# Patient Record
Sex: MALE
[De-identification: names, ages, dates, MRNs, and addresses within clinical notes are randomized per-mention and may not be internally consistent; named-entity substitution may affect disease eponyms.]

---

## 2017-07-25 ENCOUNTER — HOSPITAL ENCOUNTER (OUTPATIENT)
Dept: HOSPITAL 42 - ED | Age: 77
Setting detail: OBSERVATION
LOS: 1 days | Discharge: LEFT BEFORE DISCHARGE | End: 2017-07-26
Attending: INTERNAL MEDICINE | Admitting: INTERNAL MEDICINE
Payer: MEDICARE

## 2017-07-25 ENCOUNTER — HOSPITAL ENCOUNTER (OUTPATIENT)
Dept: HOSPITAL 42 - CATH | Age: 77
Discharge: HOME | End: 2017-07-25
Attending: INTERNAL MEDICINE
Payer: MEDICARE

## 2017-07-25 VITALS — HEART RATE: 61 BPM | RESPIRATION RATE: 18 BRPM | SYSTOLIC BLOOD PRESSURE: 164 MMHG | DIASTOLIC BLOOD PRESSURE: 74 MMHG

## 2017-07-25 VITALS — BODY MASS INDEX: 28.6 KG/M2

## 2017-07-25 VITALS — BODY MASS INDEX: 23.5 KG/M2

## 2017-07-25 VITALS — TEMPERATURE: 97.7 F

## 2017-07-25 VITALS — OXYGEN SATURATION: 99 %

## 2017-07-25 DIAGNOSIS — E78.00: ICD-10-CM

## 2017-07-25 DIAGNOSIS — Z87.891: ICD-10-CM

## 2017-07-25 DIAGNOSIS — Z91.14: ICD-10-CM

## 2017-07-25 DIAGNOSIS — Z95.5: ICD-10-CM

## 2017-07-25 DIAGNOSIS — R73.03: ICD-10-CM

## 2017-07-25 DIAGNOSIS — I25.119: Primary | ICD-10-CM

## 2017-07-25 DIAGNOSIS — I10: ICD-10-CM

## 2017-07-25 DIAGNOSIS — Z79.82: ICD-10-CM

## 2017-07-25 DIAGNOSIS — I25.10: Primary | ICD-10-CM

## 2017-07-25 DIAGNOSIS — N28.9: ICD-10-CM

## 2017-07-25 LAB
ALBUMIN SERPL-MCNC: 3.6 G/DL (ref 3–4.8)
ALBUMIN/GLOB SERPL: 1.1 {RATIO} (ref 1.1–1.8)
ALT SERPL-CCNC: 21 U/L (ref 7–56)
APTT BLD: 27.9 SECONDS (ref 23.7–30.8)
APTT BLD: 34.5 SECONDS (ref 23.7–30.8)
AST SERPL-CCNC: 27 U/L (ref 15–59)
BASOPHILS # BLD AUTO: 0.02 K/MM3 (ref 0–2)
BASOPHILS # BLD AUTO: 0.03 K/MM3 (ref 0–2)
BASOPHILS NFR BLD: 0.4 % (ref 0–3)
BASOPHILS NFR BLD: 0.6 % (ref 0–3)
BNP SERPL-MCNC: 509 PG/ML (ref 0–450)
BUN SERPL-MCNC: 29 MG/DL (ref 7–21)
BUN SERPL-MCNC: 34 MG/DL (ref 7–21)
CALCIUM SERPL-MCNC: 8.2 MG/DL (ref 8.4–10.5)
CALCIUM SERPL-MCNC: 9 MG/DL (ref 8.4–10.5)
EOSINOPHIL # BLD: 0.2 10*3/UL (ref 0–0.7)
EOSINOPHIL # BLD: 0.3 10*3/UL (ref 0–0.7)
EOSINOPHIL NFR BLD: 3 % (ref 1.5–5)
EOSINOPHIL NFR BLD: 5.7 % (ref 1.5–5)
ERYTHROCYTE [DISTWIDTH] IN BLOOD BY AUTOMATED COUNT: 13.5 % (ref 11.5–14.5)
ERYTHROCYTE [DISTWIDTH] IN BLOOD BY AUTOMATED COUNT: 13.5 % (ref 11.5–14.5)
GFR NON-AFRICAN AMERICAN: 31
GFR NON-AFRICAN AMERICAN: 39
GRANULOCYTES # BLD: 2.14 10*3/UL (ref 1.4–6.5)
GRANULOCYTES # BLD: 3.16 10*3/UL (ref 1.4–6.5)
GRANULOCYTES NFR BLD: 45.1 % (ref 50–68)
GRANULOCYTES NFR BLD: 58.4 % (ref 50–68)
HDLC SERPL-MCNC: 26 MG/DL (ref 29–60)
HGB BLD-MCNC: 13.1 GM/DL (ref 14–18)
HGB BLD-MCNC: 13.5 GM/DL (ref 14–18)
INR PPP: 1.02 (ref 0.93–1.08)
INR PPP: 1.14 (ref 0.93–1.08)
LDLC SERPL-MCNC: 78 MG/DL (ref 0–129)
LIPASE SERPL-CCNC: 60 U/L (ref 23–300)
LYMPHOCYTES # BLD: 1.6 10*3/UL (ref 1.2–3.4)
LYMPHOCYTES # BLD: 1.9 10*3/UL (ref 1.2–3.4)
LYMPHOCYTES NFR BLD AUTO: 28.9 % (ref 22–35)
LYMPHOCYTES NFR BLD AUTO: 38.9 % (ref 22–35)
MAGNESIUM SERPL-MCNC: 2.1 MG/DL (ref 1.7–2.2)
MCH RBC QN AUTO: 27.2 PG (ref 25–35)
MCH RBC QN AUTO: 27.6 PG (ref 25–35)
MCHC RBC AUTO-ENTMCNC: 34.4 G/DL (ref 31–37)
MCHC RBC AUTO-ENTMCNC: 34.8 G/DL (ref 31–37)
MCV RBC AUTO: 79 FL (ref 80–105)
MCV RBC AUTO: 79.2 FL (ref 80–105)
MONOCYTES # BLD AUTO: 0.5 10*3/UL (ref 0.1–0.6)
MONOCYTES # BLD AUTO: 0.5 10*3/UL (ref 0.1–0.6)
MONOCYTES NFR BLD: 9.3 % (ref 1–6)
MONOCYTES NFR BLD: 9.7 % (ref 1–6)
PLATELET # BLD: 134 10^3/UL (ref 120–450)
PLATELET # BLD: 148 10^3/UL (ref 120–450)
PMV BLD AUTO: 9.2 FL (ref 7–11)
PMV BLD AUTO: 9.2 FL (ref 7–11)
PROTHROMBIN TIME: 11 SECONDS (ref 9.9–11.8)
PROTHROMBIN TIME: 12.3 SECONDS (ref 9.9–11.8)
RBC # BLD AUTO: 4.75 10^6/UL (ref 3.5–6.1)
RBC # BLD AUTO: 4.96 10^6/UL (ref 3.5–6.1)
TROPONIN I SERPL-MCNC: 0.04 NG/ML
WBC # BLD AUTO: 4.8 10^3/UL (ref 4.5–11)
WBC # BLD AUTO: 5.4 10^3/UL (ref 4.5–11)

## 2017-07-25 PROCEDURE — 80061 LIPID PANEL: CPT

## 2017-07-25 PROCEDURE — 85025 COMPLETE CBC W/AUTO DIFF WBC: CPT

## 2017-07-25 PROCEDURE — 93005 ELECTROCARDIOGRAM TRACING: CPT

## 2017-07-25 PROCEDURE — 85730 THROMBOPLASTIN TIME PARTIAL: CPT

## 2017-07-25 PROCEDURE — 83880 ASSAY OF NATRIURETIC PEPTIDE: CPT

## 2017-07-25 PROCEDURE — 99285 EMERGENCY DEPT VISIT HI MDM: CPT

## 2017-07-25 PROCEDURE — 83690 ASSAY OF LIPASE: CPT

## 2017-07-25 PROCEDURE — 85610 PROTHROMBIN TIME: CPT

## 2017-07-25 PROCEDURE — 99153 MOD SED SAME PHYS/QHP EA: CPT

## 2017-07-25 PROCEDURE — 80048 BASIC METABOLIC PNL TOTAL CA: CPT

## 2017-07-25 PROCEDURE — 86850 RBC ANTIBODY SCREEN: CPT

## 2017-07-25 PROCEDURE — 83615 LACTATE (LD) (LDH) ENZYME: CPT

## 2017-07-25 PROCEDURE — 99152 MOD SED SAME PHYS/QHP 5/>YRS: CPT

## 2017-07-25 PROCEDURE — 36415 COLL VENOUS BLD VENIPUNCTURE: CPT

## 2017-07-25 PROCEDURE — 84484 ASSAY OF TROPONIN QUANT: CPT

## 2017-07-25 PROCEDURE — 83735 ASSAY OF MAGNESIUM: CPT

## 2017-07-25 PROCEDURE — 86900 BLOOD TYPING SEROLOGIC ABO: CPT

## 2017-07-25 PROCEDURE — 71010: CPT

## 2017-07-25 PROCEDURE — 93458 L HRT ARTERY/VENTRICLE ANGIO: CPT

## 2017-07-25 PROCEDURE — 96372 THER/PROPH/DIAG INJ SC/IM: CPT

## 2017-07-25 PROCEDURE — 80053 COMPREHEN METABOLIC PANEL: CPT

## 2017-07-25 PROCEDURE — 92978 ENDOLUMINL IVUS OCT C 1ST: CPT

## 2017-07-25 PROCEDURE — 82550 ASSAY OF CK (CPK): CPT

## 2017-07-25 NOTE — CARD
--------------- APPROVED REPORT --------------





EKG Measurement

Heart [id number]

ID [id number]

[id number]

[id number]

[id number]



<Conclusion>

Normal sinus rhythm

Prolonged QT

Abnormal ECG

## 2017-07-25 NOTE — CP.PCM.HP
History of Present Illness





- History of Present Illness


History of Present Illness: 





7/25/17





Dahlia Hamm is a 77 year old male, whose past medical history includes 

hypertension and hyperlipidemia, who was transferred from [facility] s/p 

cardiac cath by Dr. Campoverde today. According to Dr. Campoverde, Patient has left main 

disease as well as RCA occlusion requiring CABG.  Patient's CABG is to be  

scheduled outpatient at [facility].  However, post Cardiac Cath 

procedure, patient began complaining of chest pain radiating to his back. 

Patient describedhis pain to be a 7/10 (per wife) which nearly resolved with 

nitro.  In emergency department, patient has none to minimal pain. Patient 

denies any chest pain, shortness of breath, or any other complaint at this 

time. 








Present on Admission





- Present on Admission


Any Indicators Present on Admission: No





Past Patient History





- Past Social History


Smoking Status: Former Smoker





- CARDIAC


Hx Cardiac Disorders: Yes


Hx Hypertension: Yes


Hx Pacemaker: No





- PULMONARY


Hx Respiratory Disorders: No





- NEUROLOGICAL


Hx Paralysis: No





- HEENT


Hx HEENT Problems: No





- RENAL


Hx Chronic Kidney Disease: No





- ENDOCRINE/METABOLIC


Hx Endocrine Disorders: No





- HEMATOLOGICAL/ONCOLOGICAL


Hx Blood Transfusions: No


Hx Blood Transfusion Reaction: No





- INTEGUMENTARY


Hx Dermatological Problems: No





- MUSCULOSKELETAL/RHEUMATOLOGICAL


Hx Musculoskeletal Disorders: No





- GASTROINTESTINAL


Hx Gastrointestinal Disorders: No





- GENITOURINARY/GYNECOLOGICAL


Hx Genitourinary Disorders: No





- PSYCHIATRIC


Hx Emotional Abuse: No


Hx Physical Abuse: No


Hx Substance Use: No





- SURGICAL HISTORY


Hx Cardiac Catheterization: Yes


Hx Coronary Stent: Yes





- ANESTHESIA


Hx Anesthesia: Yes


Hx Anesthesia Reactions: No


Hx Malignant Hyperthermia: No





Meds


Allergies/Adverse Reactions: 


 Allergies











Allergy/AdvReac Type Severity Reaction Status Date / Time


 


No Known Allergies Allergy   Verified 07/25/17 16:38














Physical Exam





- Constitutional


Appears: Well





- Head Exam


Head Exam: ATRAUMATIC, NORMAL INSPECTION, NORMOCEPHALIC





- Eye Exam


Eye Exam: EOMI, Normal appearance, PERRL


Pupil Exam: NORMAL ACCOMODATION, PERRL





- ENT Exam


ENT Exam: Mucous Membranes Moist, Normal Exam





- Neck Exam


Neck exam: Positive for: Normal Inspection





- Respiratory Exam


Respiratory Exam: Clear to Auscultation Bilateral, NORMAL BREATHING PATTERN





- Cardiovascular Exam


Cardiovascular Exam: REGULAR RHYTHM





- GI/Abdominal Exam


GI & Abdominal Exam: Normal Bowel Sounds, Soft.  absent: Tenderness





- Rectal Exam


Rectal Exam: NORMAL INSPECTION





-  Exam


 Exam: Circumcision, NORMAL INSPECTION


External exam: NORMAL EXTERNAL EXAM


Speculum exam: NORMAL SPECULUM EXAM


Bimanual exam: NORMAL BIMANUAL EXAM





- Extremities Exam


Extremities exam: Positive for: normal inspection





- Back Exam


Back exam: NORMAL INSPECTION





- Neurological Exam


Neurological exam: Alert, CN II-XII Intact, Normal Gait, Oriented x3, Reflexes 

Normal





- Psychiatric Exam


Psychiatric exam: Normal Affect, Normal Mood





- Skin


Skin Exam: Dry, Intact, Normal Color, Warm





Results





- Vital Signs


Recent Vital Signs: 





 Last Vital Signs











Temp  98.7 F   07/25/17 16:38


 


Pulse  53 L  07/25/17 20:18


 


Resp  16   07/25/17 20:18


 


BP  156/47 H  07/25/17 20:18


 


Pulse Ox  97   07/25/17 20:18














- Labs


Result Diagrams: 


 07/25/17 16:40





 07/25/17 16:40





Assessment & Plan


(1) Chest pain


Status: Acute   





- Assessment and Plan (Free Text)


Assessment: 








Impression:





77 year old male complaining of chest pain radiating to his back s/p cardiac 

catheterization procedure prior to arrival.


Notes and results from previous visits were reviewed. Patient last seen in the 

ED on 04/27/13 for right eyelid swelling for a few days. Patient was admitted 

to hospitalist care for further evaluation. 


Case discussed with Dr. Campoverde, who states to place patient on Lovenox and Tridil 

as needed


Patient s/p cardiac cath, needs CABG with current chest pain; given lovenox as 

discussed with Dr. Campoverde.  Since chest pain had nearly resolved, placed on 

nitropaste instead of tridil and is chest pain free at this time.  First CE is 

indeterminate. , pt is seen in ER , family was sitting on the bed side

## 2017-07-25 NOTE — CP.PCM.PN
Subjective





- Date & Time of Evaluation


Date of Evaluation: 07/25/17


Time of Evaluation: 15:51





- Subjective


Subjective: 





called by nurse pt s/p cath today ,left main disease, needs to go for CABG,  

was to go home today ,is c/o cp associated with sob , and bp is high 1s 175/78 

.hr 68.pt states when he came back from cath he is having left sided  cp 

constant radiating to back . 





Objective





- Vital Signs/Intake and Output


Vital Signs (last 24 hours): 


 











Temp Pulse Resp BP Pulse Ox


 


 97.7 F   61   18   164/74 H  99 


 


 07/25/17 14:45  07/25/17 15:37  07/25/17 15:37  07/25/17 15:37  07/25/17 15:37











- Medications


Medications: 


 Current Medications





Sodium Chloride (Sodium Chloride 0.9%)  1,000 mls @ 100 mls/hr IV .Q10H SPENCER


   Stop: 07/25/17 16:00











- Labs


Labs: 


 





 07/25/17 07:00 





 07/25/17 07:00 





 











PT  11.0 Seconds (9.9-11.8)   07/25/17  07:00    


 


INR  1.02  (0.93-1.08)   07/25/17  07:00    


 


APTT  27.9 Seconds (23.7-30.8)   07/25/17  07:00    














- Constitutional


Appears: No Acute Distress





- Head Exam


Head Exam: NORMOCEPHALIC





- Eye Exam


Eye Exam: Normal appearance


Pupil Exam: PERRL





- ENT Exam


ENT Exam: Mucous Membranes Moist





- Neck Exam


Neck Exam: Full ROM





- Respiratory Exam


Respiratory Exam: Chest Wall Tenderness





- Cardiovascular Exam


Cardiovascular Exam: RRR, +S1, +S2





- GI/Abdominal Exam


GI & Abdominal Exam: Soft, Normal Bowel Sounds





- Rectal Exam


Rectal Exam: Deferred





- Extremities Exam


Extremities Exam: Full ROM





- Psychiatric Exam


Psychiatric exam: Normal Affect





- Skin


Skin Exam: Dry, Warm





Assessment and Plan





- Assessment and Plan (Free Text)


Assessment: 





cp /ches wall.


 hx of cad left main disease.


Plan: 





EKG stat . nitro s/l 0.4 mg x1.


 lopressor 12.5 mg x1 .


 pt has recieved asa and plavix today. 


will send the pt to ER.


PT DISCUSSED WITH DR BECCA HAHN.

## 2017-07-25 NOTE — CARDCATH
PROCEDURE DATE:  07/25/2017



HISTORY:  The patient is a 77-year-old male with multiple cardiac risk

factors including borderline diabetes mellitus, hypercholesterolemia and

hypertension, and previous PTCA and stent x3 in the past, who presents with

an abnormal stress test.  The patient has been lost to followup and has

been semi-noncompliant.



Because of his abnormal stress test, a cardiac catheterization was

recommended.



The patient was found to have a creatinine of 2.1 in which he was

pretreated with IV bicarb as well as normal saline with aggressive

hydration.



I discussed the risks, benefits of cardiac catheterization and his

increased risk of nephrotoxicity with the patient and family in detail. 

All questions were answered.  They understand and accept the risk



PROCEDURE:  Left heart catheterization with coronary angiography, left

ventricular measurement as well as IVUS performed.



The right femoral artery was cannulated with a 6-Yi sheath.  There were

no complications.



FINDINGS:  On catheterization revealed a right coronary artery that was

occluded in its proximal portion.



The left main artery was diffusely diseased with a 60% to 70% distal left

main stenoses noted.



The LAD revealed diffuse atherosclerosis with a patent stent.



The high diagonal vessel was subtotally occluded with multiple 90% lesions

noted.



The circumflex artery revealed diffuse atherosclerosis with a patent stent.



There was a large ramus intermedius that was subtotally occluded.



An IVUS was performed of the left main stenoses that was found to have a

60% stenoses.



Angio-Seal was used to close the femoral artery site.  The patient

tolerated the procedure well.



SUMMARY:  The procedure revealed critical left main stenoses with an

occluded RCA as well as critical lesions in a large ramus intermedius and a

high diagonal vessel.

Given these findings, the patient will be referred for coronary artery

bypass surgery.  I have discussed this with the patient and family in

detail.  We will arrange to transfer the patient electively as an

outpatient to [facility] for coronary artery bypass.







__________________________________________

Shilo Campoverde MD



DD:  07/25/2017 10:31:08

DT:  07/25/2017 10:34:28

Job # [id number]

## 2017-07-25 NOTE — RAD
HISTORY:

cp  



COMPARISON:

04/29/2013 



FINDINGS:



LUNGS:

No active pulmonary disease.



PLEURA:

No significant pleural effusion identified, no pneumothorax apparent.



CARDIOVASCULAR:

Normal.



OSSEOUS STRUCTURES:

No significant abnormalities.



VISUALIZED UPPER ABDOMEN:

Normal.



OTHER FINDINGS:

None.



IMPRESSION:

No active disease.

## 2017-07-25 NOTE — ED PDOC
Arrival/HPI





- General


Chief Complaint: Chest Pain


Time Seen by Provider: 07/25/17 16:24


Historian: Patient





- History of Present Illness


Narrative History of Present Illness (Text): 





07/25/17 16:40





Dahlia Hamm is a 77 year old male, whose past medical history includes 

hypertension and hyperlipidemia, who was transferred from [facility] s/p 

cardiac cath by Dr. Campoverde today. According to Dr. Campoverde, Patient has left main 

disease as well as RCA occlusion requiring CABG.  Patient's CABG is to be  

scheduled outpatient at [facility].  However, post Cardiac Cath 

procedure, patient began complaining of chest pain radiating to his back. 

Patient describedhis pain to be a 7/10 (per wife) which nearly resolved with 

nitro.  In emergency department, patient has none to minimal pain. Patient 

denies any chest pain, shortness of breath, or any other complaint at this 

time. 





PMD: None





Time/Duration: 1-3 hours


Symptom Onset: Gradual


Symptom Course: Improving


Severity Level: 7


Context: Other (Cardiac cath lab)





Past Medical History





- Provider Review


Nursing Documentation Reviewed: Yes





- Cardiac


Hx Cardiac Disorders: Yes


Hx Hypertension: Yes


Hx Pacemaker: No





- Pulmonary


Hx Respiratory Disorders: No





- Neurological


Hx Paralysis: No





- HEENT


Hx HEENT Disorder: No





- Renal


Hx Renal Disorder: No





- Endocrine/Metabolic


Hx Endocrine Disorders: No





- Hematological/Oncological


Hx Blood Transfusions: No


Hx Blood Transfusion Reaction: No





- Integumentary


Hx Dermatological Disorder: No





- Musculoskeletal/Rheumatological


Hx Musculoskeletal Disorders: No





- Gastrointestinal


Hx Gastrointestinal Disorders: No





- Genitourinary/Gynecological


Hx Genitourinary Disorders: No





- Psychiatric


Hx Emotional Abuse: No


Hx Physical Abuse: No


Hx Substance Use: No





- Surgical History


Hx Cardiac Catheterization: Yes


Hx Coronary Stent: Yes





- Anesthesia


Hx Anesthesia: Yes


Hx Anesthesia Reactions: No


Hx Malignant Hyperthermia: No





- Suicidal Assessment


Feels Threatened In Home Enviroment: No





Family/Social History





- Physician Review


Nursing Documentation Reviewed: Yes


Family/Social History: No Known Family HX


Smoking Status: Former Smoker


Hx Alcohol Use: No


Hx Substance Use: No


Hx Substance Use Treatment: No





Allergies/Home Meds


Allergies/Adverse Reactions: 


Allergies





No Known Allergies Allergy (Verified 07/25/17 16:38)


 








Home Medications: 


 Home Meds











 Medication  Instructions  Recorded  Confirmed


 


Amlodipine Besylate 5 mg PO DAILY 04/22/13 07/25/17


 


Metoprolol Succinate 25 mg PO BID 04/22/13 07/25/17


 


Simvastatin 20 mg PO DAILY 04/22/13 07/25/17


 


Aspirin [Ecotrin] 81 mg PO DAILY 07/17/17 07/25/17


 


Vitamin B Complex [B Complex] 1 tab PO DAILY 07/17/17 07/25/17














Review of Systems





- Physician Review


All systems were reviewed & negative as marked: Yes





- Review of Systems


Constitutional: absent: Fevers


Eyes: absent: Vision Changes


ENT: absent: Hearing Changes


Respiratory: absent: SOB


Cardiovascular: Chest Pain


Gastrointestinal: absent: Abdominal Pain


Genitourinary Male: absent: Dysuria


Musculoskeletal: Back Pain


Skin: absent: Rash


Neurological: absent: Headache, Dizziness


Endocrine: absent: Diaphoresis


Hemo/Lymphatic: absent: Adenopathy


Psychiatric: absent: Anxiety





Physical Exam


Vital Signs Reviewed: Yes


Vital Signs











  Temp Pulse Resp BP Pulse Ox


 


 07/25/17 17:22   56 L  16  159/65 H  97


 


 07/25/17 16:38  98.7 F  55 L  20  142/77  99











Temperature: Afebrile


Blood Pressure: Normal


Pulse: Bradycardic


Respiratory Rate: Normal


Appearance: Positive for: Well-Appearing, Non-Toxic, Comfortable


Pain Distress: None


Mental Status: Positive for: Alert and Oriented X 3





- Systems Exam


Head: Present: Atraumatic, Normocephalic


Pupils: Present: PERRL


Conjunctiva: Present: Normal


Mouth: Present: Moist Mucous Membranes


Pharnyx: Present: Normal.  No: ERYTHEMA, EXUDATE


Neck: Present: Normal Range of Motion


Respiratory/Chest: Present: Clear to Auscultation, Good Air Exchange.  No: 

Respiratory Distress, Accessory Muscle Use


Cardiovascular: Present: Regular Rate and Rhythm, Normal S1, S2.  No: Murmurs


Abdomen: Present: Normal Bowel Sounds.  No: Tenderness, Distention, Peritoneal 

Signs


Back: Present: Normal Inspection


Upper Extremity: Present: Normal Inspection.  No: Cyanosis, Edema


Lower Extremity: Present: Normal Inspection.  No: Edema


Neurological: Present: GCS=15, CN II-XII Intact, Speech Normal


Skin: Present: Warm, Dry, Normal Color.  No: Rashes


Psychiatric: Present: Alert, Oriented x 3, Normal Insight, Normal Concentration





Medical Decision Making


ED Course and Treatment: 





07/25/17 16:40


Impression:





77 year old male complaining of chest pain radiating to his back s/p cardiac 

catheterization procedure prior to arrival.





Plan:


-- EKG


-- Labs


-- Lovenox and Nitroglycerin


-- Reassess and disposition





Prior Visits:


Notes and results from previous visits were reviewed. Patient last seen in the 

ED on 04/27/13 for right eyelid swelling for a few days. Patient was admitted 

to hospitalist care for further evaluation. 





Progress Notes:








07/25/17 16:45


Case discussed with Dr. Campoverde, who states to place patient on Lovenox and Tridil 

as needed.





7/25/17 18:33


Patient s/p cardiac cath, needs CABG with current chest pain; given lovenox as 

discussed with Dr. Campoverde.  Since chest pain had nearly resolved, placed on 

nitropaste instead of tridil and is chest pain free at this time.  First CE is 

indeterminate.  Case discussed with on-call admitting attending Dr. Ly for 

placement on her service.





- Lab Interpretations


Lab Results: 








 07/25/17 16:40 





 07/25/17 16:40 





 Lab Results





07/25/17 16:40: Sodium 138, Potassium 3.5 L, Chloride 103, Carbon Dioxide 26, 

Anion Gap 14, BUN 29 H, Creatinine 1.7 H, Est GFR ( Amer) 48, Est GFR (

Non-Af Amer) 39, Random Glucose 87, Calcium 8.2 L, Magnesium 2.1, Total 

Bilirubin 1.4 H, AST 27, ALT 21, Alkaline Phosphatase 43, Lactate Dehydrogenase 

523, Total Creatine Kinase 37, Troponin I 0.04, NT-Pro-B Natriuret Pep 509 H, 

Total Protein 6.9, Albumin 3.6, Globulin 3.3, Albumin/Globulin Ratio 1.1, 

Lipase 60


07/25/17 16:40: PT 12.3 H, INR 1.14 H, APTT 34.5 H


07/25/17 16:40: WBC 5.4, RBC 4.75, Hgb 13.1 L, Hct 37.6 L, MCV 79.2 L, MCH 27.6

, MCHC 34.8, RDW 13.5, Plt Count 134, MPV 9.2, Gran % 58.4, Lymph % (Auto) 28.9

, Mono % (Auto) 9.3 H, Eos % (Auto) 3.0, Baso % (Auto) 0.4, Gran # 3.16, Lymph 

# 1.6, Mono # 0.5, Eos # 0.2, Baso # 0.02








I have reviewed the lab results: Yes





- RAD Interpretation


Radiology Orders: 








07/25/17 16:33


CHEST PORTABLE [RAD] Stat 














- EKG Interpretation


EKG Interpretation (Text): 





07/25/17 18:36


NSR @ 61 with nonspecific T wave changes; no previous ekg's in system prior to 

today.  Normal axis.  QRS is 102.


Interpreted by ED Physician: Yes


Type: 12 lead EKG


Comparison: No previous EKG avail.





- Medication Orders


Current Medication Orders: 











Discontinued Medications





Enoxaparin Sodium (Lovenox)  60 mg SC STAT STA


   PRN Reason: Protocol


   Stop: 07/25/17 16:34


   Last Admin: 07/25/17 17:11  Dose: 60 mg





Nitroglycerin (Nitro-Bid 2% Oint)  1 ea TOP STAT STA


   Stop: 07/25/17 17:03


   Last Admin: 07/25/17 17:32  Dose: 1 ea











- Scribe Statement


The provider has reviewed the documentation as recorded by the Daxibmarito Farley





Provider Scribe Attestation:


All medical record entries made by the Scribe were at my direction and 

personally dictated by me. I have reviewed the chart and agree that the record 

accurately reflects my personal performance of the history, physical exam, 

medical decision making, and the department course for this patient. I have 

also personally directed, reviewed, and agree with the discharge instructions 

and disposition.





Disposition/Present on Arrival





- Present on Arrival


Any Indicators Present on Arrival: No


History of DVT/PE: No


History of Uncontrolled Diabetes: No


Urinary Catheter: No


History of Decub. Ulcer: No


History Surgical Site Infection Following: None





- Disposition


Have Diagnosis and Disposition been Completed?: Yes


Diagnosis: 


 Chest pain





Disposition: HOSPITALIZED


Disposition Time: 18:15


Patient Plan: Observation, Telemetry


Condition: STABLE


Discharge Instructions (ExitCare):  Chest Pain (ED)


Referrals: 


PCP,NO [Primary Care Provider] - Follow up with primary

## 2017-07-25 NOTE — CARD
--------------- APPROVED REPORT --------------





EKG Measurement

Heart [id number]

NV [id number]

[id number]

[id number]

[id number]



<Conclusion>

Normal sinus rhythm

Nonspecific T wave abnormality

Prolonged QT

Abnormal ECG

## 2017-07-26 VITALS — OXYGEN SATURATION: 98 % | TEMPERATURE: 98.7 F

## 2017-07-26 VITALS — SYSTOLIC BLOOD PRESSURE: 156 MMHG | DIASTOLIC BLOOD PRESSURE: 69 MMHG | HEART RATE: 62 BPM

## 2017-07-26 VITALS — RESPIRATION RATE: 18 BRPM

## 2017-07-26 LAB — TROPONIN I SERPL-MCNC: 0.1 NG/ML

## 2017-07-26 NOTE — CP.PCM.PN
Subjective





- Date & Time of Evaluation


Date of Evaluation: 07/26/17


Time of Evaluation: 08:31





- Subjective


Subjective: 





called by nurse pt wants to go home against medical advise.pt was admitted for 

cp s/p cardiac cath yesterday ,with left main disease , and was supposed to be 

transferred to [facility] .for CABG. BUT PT states he wants to 

go home.





Objective





- Vital Signs/Intake and Output


Vital Signs (last 24 hours): 


 











Temp Pulse Resp BP Pulse Ox


 


 98.7 F   63   18   159/79 H  98 


 


 07/26/17 06:00  07/26/17 06:00  07/26/17 06:00  07/26/17 06:00  07/26/17 06:00











- Medications


Medications: 


 Current Medications





Aspirin (Ecotrin)  81 mg PO DAILY [facility]


Atorvastatin Calcium (Lipitor)  10 mg PO DIN [facility]


Enoxaparin Sodium (Lovenox)  40 mg SC DAILY [facility]


   PRN Reason: Protocol


Metoprolol Succinate (Toprol Xl)  25 mg PO BID [facility]


Vitamin B Complex/Vit C/Folic Acid (Nephro-Estefani)  1 tab PO DAILY [facility]











- Labs


Labs: 


 











PT  12.3 Seconds (9.9-11.8)  H  07/25/17  16:40    


 


INR  1.14  (0.93-1.08)  H  07/25/17  16:40    


 


APTT  34.5 Seconds (23.7-30.8)  H  07/25/17  16:40    














- Constitutional


Appears: No Acute Distress





- Head Exam


Head Exam: NORMOCEPHALIC





- Eye Exam


Eye Exam: Normal appearance


Pupil Exam: PERRL





- ENT Exam


ENT Exam: Mucous Membranes Moist





- Neck Exam


Neck Exam: Full ROM





- Respiratory Exam


Respiratory Exam: Clear to Ausculation Bilateral





- Cardiovascular Exam


Cardiovascular Exam: RRR, +S1, +S2





- GI/Abdominal Exam


GI & Abdominal Exam: Soft, Normal Bowel Sounds





- Rectal Exam


Rectal Exam: Deferred





- Extremities Exam


Extremities Exam: Full ROM





- Neurological Exam


Neurological Exam: Alert, Awake, CN II-XII Intact, Oriented x3





- Psychiatric Exam


Psychiatric exam: Normal Affect





- Skin


Skin Exam: Dry, Warm





Assessment and Plan





- Assessment and Plan (Free Text)


Assessment: 





AMA.


 CAD /[place] disease.


Plan: 





pt was told about the risk of heart attach , death . pt had conversation with 

DR smith earlier about this.

## 2017-07-26 NOTE — CON
DATE:  07/26/2017



HISTORY OF PRESENT ILLNESS:  The patient is a 77-year-old male who recently

retired, who presents with angina.  The stress test was abnormal.  Cardiac

catheterization revealed multivessel CAD including a critical left main

stenoses.  Arrangements were made to send the patient for bypass surgery at

[facility].  Post catheterization, the patient developed chest

pain for which he was admitted to the hospital.



PAST MEDICAL HISTORY:  Includes multivessel PTCA and stent in the past.  He

suffers from hypertension, hypercholesterolemia and has been noncompliant

with his medication, medical advice and followup.



SOCIAL HISTORY:  Does not smoke.  He is a former .



REVIEW OF SYSTEMS:  The 14 point review of systems was reviewed this

morning.  The patient is symptom free from a cardiac perspective.



PHYSICAL EXAMINATION:

VITAL SIGNS:  Blood pressure 159/79, heart rate in the 60s.

NECK:  Negative JVD.

LUNGS:  Without rales.

CARDIOPULMONARY:  Heart with S1 and S2.

EXTREMITIES:  Without edema.



EKG:  Shows nonspecific ST-T changes.



LABORATORY DATA:  Reveals a troponin of 0.10.  The creatinine is 1.7 with a

hemoglobin of 13.1.



IMPRESSION:

1.  Non-ST-elevation myocardial infarction.

2.  Coronary artery disease.

3.  Multivessel coronary artery disease with left main stenoses.

4.  Renal insufficiency.

5.  Hypertension.

6.  Hypercholesterolemia.



PLAN:  Given these findings, we will need to treat the patient with

aspirin, Lovenox as well as beta blockers.  We will need to transfer the

patient to [facility] for coronary artery bypass surgery.  I

discussed this with the patient in detail.







__________________________________________

Shilo Campoverde MD



DD:  07/26/2017 8:02:47

DT:  07/26/2017 8:08:05

Job # [id number]

## 2018-05-17 ENCOUNTER — HOSPITAL ENCOUNTER (INPATIENT)
Dept: HOSPITAL 42 - ED | Age: 78
LOS: 2 days | Discharge: HOME | DRG: 291 | End: 2018-05-19
Attending: INTERNAL MEDICINE | Admitting: HOSPITALIST
Payer: MEDICARE

## 2018-05-17 VITALS — BODY MASS INDEX: 23 KG/M2

## 2018-05-17 DIAGNOSIS — I31.3: ICD-10-CM

## 2018-05-17 DIAGNOSIS — Z79.899: ICD-10-CM

## 2018-05-17 DIAGNOSIS — I13.0: Primary | ICD-10-CM

## 2018-05-17 DIAGNOSIS — R00.1: ICD-10-CM

## 2018-05-17 DIAGNOSIS — I50.21: ICD-10-CM

## 2018-05-17 DIAGNOSIS — Z95.5: ICD-10-CM

## 2018-05-17 DIAGNOSIS — I25.10: ICD-10-CM

## 2018-05-17 DIAGNOSIS — D50.9: ICD-10-CM

## 2018-05-17 DIAGNOSIS — M89.9: ICD-10-CM

## 2018-05-17 DIAGNOSIS — I27.20: ICD-10-CM

## 2018-05-17 DIAGNOSIS — E78.5: ICD-10-CM

## 2018-05-17 DIAGNOSIS — Z79.82: ICD-10-CM

## 2018-05-17 DIAGNOSIS — E78.00: ICD-10-CM

## 2018-05-17 DIAGNOSIS — N18.4: ICD-10-CM

## 2018-05-17 DIAGNOSIS — E87.6: ICD-10-CM

## 2018-05-17 DIAGNOSIS — Z95.1: ICD-10-CM

## 2018-05-17 LAB
% IRON SATURATION: 17 % (ref 20–55)
APPEARANCE UR: CLEAR
APTT BLD: 30.9 SECONDS (ref 25.1–36.5)
BACTERIA #/AREA URNS HPF: (no result) /[HPF]
BASOPHILS # BLD AUTO: 0.06 K/MM3 (ref 0–2)
BASOPHILS NFR BLD: 1.3 % (ref 0–3)
BILIRUB UR-MCNC: NEGATIVE MG/DL
BNP SERPL-MCNC: 2580 PG/ML (ref 0–450)
BUN SERPL-MCNC: 37 MG/DL (ref 7–21)
CALCIUM SERPL-MCNC: 9.2 MG/DL (ref 8.4–10.5)
COLOR UR: YELLOW
EOSINOPHIL # BLD: 0.3 10*3/UL (ref 0–0.7)
EOSINOPHIL NFR BLD: 7.2 % (ref 1.5–5)
EPI CELLS #/AREA URNS HPF: (no result) /HPF (ref 0–5)
ERYTHROCYTE [DISTWIDTH] IN BLOOD BY AUTOMATED COUNT: 13.7 % (ref 11.5–14.5)
GFR NON-AFRICAN AMERICAN: 29
GLUCOSE UR STRIP-MCNC: NEGATIVE MG/DL
GRANULOCYTES # BLD: 2.18 10*3/UL (ref 1.4–6.5)
GRANULOCYTES NFR BLD: 46.3 % (ref 50–68)
HGB BLD-MCNC: 11.3 G/DL (ref 14–18)
INR PPP: 1.09 (ref 0.93–1.08)
IRON SERPL-MCNC: 48 UG/DL (ref 45–180)
LEUKOCYTE ESTERASE UR-ACNC: NEGATIVE LEU/UL
LYMPHOCYTES # BLD: 1.7 10*3/UL (ref 1.2–3.4)
LYMPHOCYTES NFR BLD AUTO: 36.7 % (ref 22–35)
MCH RBC QN AUTO: 27.7 PG (ref 25–35)
MCHC RBC AUTO-ENTMCNC: 33.4 G/DL (ref 31–37)
MCV RBC AUTO: 82.8 FL (ref 80–105)
MONOCYTES # BLD AUTO: 0.4 10*3/UL (ref 0.1–0.6)
MONOCYTES NFR BLD: 8.5 % (ref 1–6)
PH UR STRIP: 7 [PH] (ref 4.7–8)
PLATELET # BLD: 214 10^3/UL (ref 120–450)
PMV BLD AUTO: 9.4 FL (ref 7–11)
PROT UR STRIP-MCNC: 30 MG/DL
PROTHROMBIN TIME: 12.6 SECONDS (ref 9.4–12.5)
RBC # BLD AUTO: 4.08 10^6/UL (ref 3.5–6.1)
RBC # UR STRIP: (no result) /UL
SP GR UR STRIP: 1.01 (ref 1–1.03)
TIBC SERPL-MCNC: 289 UG/DL (ref 261–462)
TROPONIN I SERPL-MCNC: < 0.01 NG/ML
UROBILINOGEN UR STRIP-ACNC: 0.2 E.U./DL
WBC # BLD AUTO: 4.7 10^3/UL (ref 4.5–11)
WBC #/AREA URNS HPF: (no result) /HPF (ref 0–6)

## 2018-05-17 NOTE — CARD
--------------- APPROVED REPORT --------------





EKG Measurement

Heart [id number]

OH [id number]

[id number]

[id number]

[id number]



<Conclusion>

Sinus bradycardia

Low voltage QRS

Prolonged QT

Abnormal ECG

## 2018-05-17 NOTE — RAD
HISTORY:

pleural effusions  



COMPARISON:

7/25/2017



TECHNIQUE:

Chest PA and lateral



FINDINGS:



LUNGS:

Lung volumes now more shallow. Compared to the prior exam interval 

bilateral pleural effusions are now present. Bibasilar compressive 

atelectatic changes with or without infiltrates consistent with this.



PLEURA:

Interval bilateral pleural effusions.  No pneumothorax.



CARDIOVASCULAR:

Mild cardiomegaly.  Interval midline sternotomy and inferred coronary 

artery bypass clips changes.



OSSEOUS STRUCTURES:

Thoracic spondylosis.  Midline sternotomy. Right shoulder arthrosis.



VISUALIZED UPPER ABDOMEN:

Normal.



OTHER FINDINGS:

None.



IMPRESSION:

Interval bibasilar pleural effusions.  Bibasilar inferred compressive 

atelectasis. Concomitant infiltrates at either lung base not 

excluded. Interval postsurgical changes

## 2018-05-17 NOTE — CP.PCM.HP
<Hannah Brooks - Last Filed: 05/17/18 14:11>





History of Present Illness





- History of Present Illness


History of Present Illness: 





Hannah Brooks, PGY1, H&P for Dr Lopez:





CC: exertional sob, lower extremity edema





77 M with history of CAD s/p ILYA and CABG (09/2017), hypertension, CKD GFR 15%, 

hyperlipidemia, presents for exertional sob for past 1 week. Pt reports 

increasing sob with activities like walking a few blocks. Also reports leg 

edema over past month, orthopnea. Pt's PMD did outpatient CT chest, which 

showed bilateral large pleural effusions, pt was sent here to [facility] for 

evaluation. Denies fevers, chills, cp, palpitations, nausea, vomiting, 

abdominal pain, urinary symptoms, scrotal swelling.





In ED, pt afebrile, BP elevated 188/81, Cr 2.2 (baseline 1.7, a month ago - pt 

Cr was in 3s as per Nephro). BNP 2580 (prev 509). Given Hydralazine 10 mg PO, 

Lasix 40 IV, K 20 meq.





12 point ROS obtained and negative, except as per HPI.





Pharm: [facility] in [place]


PMD: Dr. Reddy


PMH: CAD s/p CABG (9/2017 at [facility]), Hypertension, CKD, 

hyperlipidemia


Allergies: NKDA


Surgical History: CABG, Stents


Family History: denies


Social History: denies tobacco, alcohol, illicit drug use











Present on Admission





- Present on Admission


Any Indicators Present on Admission: No


History of DVT/PE: No


History of Uncontrolled Diabetes: No


Urinary Catheter: No


Decubitus Ulcer Present: No





Review of Systems





- Review of Systems


All systems: reviewed and no additional remarkable complaints except


Review of Systems: 





as per HPI





Past Patient History





- Past Social History


Smoking Status: Never Smoked





- CARDIAC


Hx Cardiac Disorders: Yes


Hx Hypertension: Yes


Hx Pacemaker: No


Other/Comment: ptca with 4 stents 15 yrs ago





- PULMONARY


Hx Respiratory Disorders: No





- NEUROLOGICAL


Hx Neurological Disorder: No





- HEENT


Hx HEENT Problems: No


Other/Comment: cellulitis to r orbit 2013





- RENAL


Hx Chronic Kidney Disease: Yes


Other/Comment: kidney disease





- ENDOCRINE/METABOLIC


Hx Endocrine Disorders: No





- HEMATOLOGICAL/ONCOLOGICAL


Hx Blood Disorders: No





- INTEGUMENTARY


Hx Dermatological Problems: No





- MUSCULOSKELETAL/RHEUMATOLOGICAL


Hx Musculoskeletal Disorders: No


Hx Falls: No





- GASTROINTESTINAL


Hx Gastrointestinal Disorders: No





- GENITOURINARY/GYNECOLOGICAL


Hx Genitourinary Disorders: No





- PSYCHIATRIC


Hx Emotional Abuse: No


Hx Physical Abuse: No


Hx Substance Use: No





- SURGICAL HISTORY


Hx Cardiac Catheterization: Yes


Hx Coronary Stent: Yes





- ANESTHESIA


Hx Anesthesia: Yes


Hx Anesthesia Reactions: No


Hx Malignant Hyperthermia: No





Meds


Allergies/Adverse Reactions: 


 Allergies











Allergy/AdvReac Type Severity Reaction Status Date / Time


 


No Known Allergies Allergy   Verified 05/17/18 12:23














Physical Exam





- Constitutional


Appears: Non-toxic, No Acute Distress





- Head Exam


Head Exam: ATRAUMATIC, NORMOCEPHALIC





- Eye Exam


Eye Exam: EOMI, PERRL.  absent: Conjunctival injection, Nystagmus, Scleral 

icterus


Pupil Exam: NORMAL ACCOMODATION, PERRL.  absent: Irregular, Miosis, Mydriatic, 

Unequal





- ENT Exam


ENT Exam: Mucous Membranes Moist





- Neck Exam


Neck exam: Positive for: Full Rom





- Respiratory Exam


Respiratory Exam: NORMAL BREATHING PATTERN.  absent: Accessory Muscle Use, 

Chest Wall Tenderness, Rales, Rhonchi, Wheezes, Respiratory Distress, Stridor


Additional comments: 





decreased breath sounds bilaterally in lower lobes. Dullness to percussion in 

lower lobes





- Cardiovascular Exam


Cardiovascular Exam: Bradycardia, +S1, +S2.  absent: Systolic Murmur





- GI/Abdominal Exam


GI & Abdominal Exam: Normal Bowel Sounds, Soft.  absent: Distended, Firm, 

Guarding, Organomegaly, Pulsatile Mass, Rebound, Rigid, Tenderness





- Extremities Exam


Extremities exam: Positive for: pedal edema (2-3+ bilaterally).  Negative for: 

tenderness





- Back Exam


Back exam: NORMAL INSPECTION





- Neurological Exam


Neurological exam: Alert, Oriented x3





- Psychiatric Exam


Psychiatric exam: Normal Affect, Normal Mood





- Skin


Skin Exam: Dry, Normal Color, Warm





Results





- Vital Signs


Recent Vital Signs: 





 Last Vital Signs











Temp  97.9 F   05/17/18 09:12


 


Pulse  56 L  05/17/18 09:12


 


Resp  19   05/17/18 09:16


 


BP  177/71 H  05/17/18 12:35


 


Pulse Ox  97   05/17/18 09:16














- Labs


Result Diagrams: 


 05/17/18 09:40





 05/17/18 06:40


Labs: 





 Laboratory Results - last 24 hr











  05/17/18 05/17/18 05/17/18





  11:20 11:58 12:00


 


PT  12.6 H  


 


INR  1.09 H  


 


APTT  30.9  


 


Iron    48


 


TIBC    289


 


% Saturation    17 L


 


Urine Color   Yellow 


 


Urine Appearance   Clear 


 


Urine pH   7.0 


 


Ur Specific Gravity   1.010 


 


Urine Protein   30 H 


 


Urine Glucose (UA)   Negative 


 


Urine Ketones   Negative 


 


Urine Blood   Trace-lysed H 


 


Urine Nitrate   Negative 


 


Urine Bilirubin   Negative 


 


Urine Urobilinogen   0.2 


 


Ur Leukocyte Esterase   Negative 


 


Urine RBC   1 - 3 


 


Urine WBC   0 - 2 


 


Ur Epithelial Cells   None 


 


Urine Bacteria   Few 














Assessment & Plan





- Assessment and Plan (Free Text)


Assessment: 











77 year old male with PMH CAD, HTN, CKD, HLD, presents for exertional sob and 

lower extremity edema, found to be in CHF exacerbation with bilateral large 

pleural effusions:





Bilateral pleural effusions:


2/2 CHF exacerbation vs less likely malignancy, pneumonia


- BNP elevated


- Lasix 80 mg IV BID


- Cardio consulted - Dr Campoverde. F/u recs


- IR consulted. 


- Echo 7/17: EF 53.7%. LVH with good LV function. dilated LA. 


- Echo


- CXR


- Strict I&Os, daily weights


- Monitor





CKD:


- Cr 2.1 (previously a month ago Cr was reportedly in 3's)


- Avoid nephrotoxic agents


- Dr Muse consulted. F/u recs.


- home sodium bicarb





Hx of CAD/HTN:


- home ASA 81, metoprolol.


- Hold home CCB in setting of leg swelling


- EKG shows S bradycardia HR 57, QTc 488 ms prolonged. 


- Monitor





Hx of HLD:


- home statin





PPX: pepcid, scds





Case seen and discussed with Dr Lopez.


Hannah Brooks, PGY1








- Date & Time


Date: 05/17/18


Time: 14:33





<Alexandra Lopez - Last Filed: 05/18/18 15:09>





Results





- Vital Signs


Recent Vital Signs: 





 Last Vital Signs











Temp  98.2 F   05/18/18 12:00


 


Pulse  86   05/18/18 12:00


 


Resp  18   05/18/18 12:00


 


BP  139/86   05/18/18 12:00


 


Pulse Ox  95   05/18/18 06:00














- Labs


Result Diagrams: 


 05/18/18 05:30





 05/18/18 05:30


Labs: 





 Laboratory Results - last 24 hr











  05/17/18 05/18/18 05/18/18





  12:00 05:30 05:30


 


WBC    4.4 L


 


RBC    4.09


 


Hgb    11.1 L


 


Hct    33.8 L


 


MCV    82.6


 


MCH    27.1


 


MCHC    32.8


 


RDW    13.9


 


Plt Count    240


 


MPV    9.9


 


Gran %    51.9


 


Lymph % (Auto)    32.8


 


Mono % (Auto)    8.7 H


 


Eos % (Auto)    5.7 H


 


Baso % (Auto)    0.9


 


Gran #    2.28


 


Lymph # (Auto)    1.4


 


Mono # (Auto)    0.4


 


Eos # (Auto)    0.3


 


Baso # (Auto)    0.04


 


Sodium   


 


Potassium   


 


Chloride   


 


Carbon Dioxide   


 


Anion Gap   


 


BUN   


 


Creatinine   


 


Est GFR ( Amer)   


 


Est GFR (Non-Af Amer)   


 


Random Glucose   


 


Calcium   


 


Ferritin  70.7  


 


Total Bilirubin   


 


AST   


 


ALT   


 


Alkaline Phosphatase   


 


Total Protein   


 


Albumin   


 


Globulin   


 


Albumin/Globulin Ratio   


 


25-OH Vitamin D Total   14.1 L 














  05/18/18





  05:30


 


WBC 


 


RBC 


 


Hgb 


 


Hct 


 


MCV 


 


MCH 


 


MCHC 


 


RDW 


 


Plt Count 


 


MPV 


 


Gran % 


 


Lymph % (Auto) 


 


Mono % (Auto) 


 


Eos % (Auto) 


 


Baso % (Auto) 


 


Gran # 


 


Lymph # (Auto) 


 


Mono # (Auto) 


 


Eos # (Auto) 


 


Baso # (Auto) 


 


Sodium  142


 


Potassium  2.9 L*


 


Chloride  102


 


Carbon Dioxide  26


 


Anion Gap  17


 


BUN  36 H


 


Creatinine  2.4 H


 


Est GFR ( Amer)  32


 


Est GFR (Non-Af Amer)  26


 


Random Glucose  89


 


Calcium  9.1


 


Ferritin 


 


Total Bilirubin  0.9


 


AST  20


 


ALT  18


 


Alkaline Phosphatase  56


 


Total Protein  7.2


 


Albumin  3.9


 


Globulin  3.3


 


Albumin/Globulin Ratio  1.2


 


25-OH Vitamin D Total 














Attending/Attestation





- Attestation


I have personally seen and examined this patient.: Yes


I have fully participated in the care of the patient.: Yes


I have reviewed all pertinent clinical information: Yes


Notes (Text): 





05/18/18 15:03





Attending note;





Patient seen and examined with resident in ER.


Patient's wife by the bedside.





Patient is a77 Male with history of CAD and CABG (09/2017), hypertension, CKD 

GFR 15%, hyperlipidemia, presents for shortness of breath on exertion for past 

1 week. Patient also has leg swelling.


Acute systolic heart failure; started on IV Lasix.


Monitor potassium level.





Patient had significant pleural effusion in the chest CT.


Repeat echocardiogram ordered. Cardiology evaluation requested.


Chronic kidney disease; creatinine is stable. Patient might have worsening 

renal function with aggressive diuresis.


Nephrology evaluation requested.





IR consult requested for diagnostic and therapeutic thoracentesis.





Case discussed with patient in detail.





Case discussed with patient's wife  and son in detail.





Upon discharge the patient will follow-up with PMD Dr. Jackson.

## 2018-05-17 NOTE — ED PDOC
Arrival/HPI





- General


Historian: Patient





- History of Present Illness


Time/Duration: Prior to Arrival


Symptom Onset: Gradual


Symptom Course: Unchanged





- General


Chief Complaint: Shortness Of Breath


Time Seen by Provider: 05/17/18 08:44





- History of Present Illness


Narrative History of Present Illness (Text): 





05/17/18 09:18


Patient is a 77 M with history of CAD s/p ILYA and CABG, hypertension, CKD GFR 15

%, hyperlipidemia who presents from PMD Dr. Reddy after CT chest was done 

which revealed moderate to large bilateral pleural effusions with minimal 

consolidation at lung bases adjacent to the effusions and small pericardial 

effusion. Patient's family endorsed as of late, patient has become increasingly 

short of breath with walking; requires breaks after walking half a block, has 

increased b/l edema from his baseline, patient also now sleeps sitting in an 

upward position and has been noted to be increasingly short of breath as of 

late. Patient currently denies any symptoms of complaints states "He feels fine.

" Denies shortness of breath, chest pain, fevers, chills, palpitations, chest 

pain, abdominal pain, nausea, vomiting, diarrhea. 





PMD: Dr. Reddy


PMH: CAD, Hypertension, CKD, hyperlipidemia


Allergies: NKDA


Surgical History: CABG (9/2017), previous PTCA and stents x 3


Family History: denies


Social History: denies tobacco, alcohol, illicit drug use








 (Sean Louise)





Past Medical History





- Provider Review


Nursing Documentation Reviewed: Yes





- Cardiac


Hx Cardiac Disorders: Yes


Hx Hypertension: Yes


Hx Pacemaker: No


Other/Comment: ptca with 4 stents 15 yrs ago





- Pulmonary


Hx Respiratory Disorders: No





- Neurological


Hx Neurological Disorder: No





- HEENT


Hx HEENT Disorder: No


Other/Comment: cellulitis to r orbit 2013





- Renal


Hx Renal Disorder: Yes


Other/Comment: kidney disease





- Endocrine/Metabolic


Hx Endocrine Disorders: No





- Hematological/Oncological


Hx Blood Disorders: No





- Integumentary


Hx Dermatological Disorder: No





- Musculoskeletal/Rheumatological


Hx Musculoskeletal Disorders: No


Hx Falls: No





- Gastrointestinal


Hx Gastrointestinal Disorders: No





- Genitourinary/Gynecological


Hx Genitourinary Disorders: No





- Psychiatric


Hx Emotional Abuse: No


Hx Physical Abuse: No


Hx Substance Use: No





- Surgical History


Hx Cardiac Catheterization: Yes


Hx Coronary Stent: Yes





- Anesthesia


Hx Anesthesia: Yes


Hx Anesthesia Reactions: No


Hx Malignant Hyperthermia: No





- Suicidal Assessment


Feels Threatened In Home Enviroment: No





Family/Social History





- Physician Review


Nursing Documentation Reviewed: Yes


Family/Social History: No Known Family HX, Other


Smoking Status: Never Smoked


Hx Alcohol Use: No


Hx Substance Use: No


Hx Substance Use Treatment: No





Allergies/Home Meds


Allergies/Adverse Reactions: 


Allergies





No Known Allergies Allergy (Verified 05/17/18 12:23)


 








Home Medications: 


 Home Meds











 Medication  Instructions  Recorded  Confirmed


 


Amlodipine Besylate 5 mg PO DAILY 04/22/13 05/17/18


 


Metoprolol Succinate 25 mg PO BID 04/22/13 05/17/18


 


Simvastatin 20 mg PO DAILY 04/22/13 05/17/18


 


Aspirin [Ecotrin] 81 mg PO DAILY 07/17/17 05/17/18


 


Vitamin B Complex [B Complex] 1 tab PO DAILY 07/17/17 05/17/18














Review of Systems





- Physician Review


All systems were reviewed & negative as marked: Yes





- Review of Systems


Constitutional: absent: Fatigue, Fevers


Eyes: absent: Vision Changes


ENT: Normal


Respiratory: Normal.  absent: SOB, Cough


Cardiovascular: Edema.  absent: Chest Pain, Palpitations


Gastrointestinal: absent: Abdominal Pain, Diarrhea, Nausea, Vomiting


Musculoskeletal: Normal


Neurological: absent: Headache, Dizziness


Endocrine: Normal


Hemo/Lymphatic: Normal


Psychiatric: Normal





Physical Exam


Vital Signs Reviewed: Yes


Temperature: Afebrile


Blood Pressure: Normal


Pulse: Regular


Respiratory Rate: Normal


Appearance: Positive for: Well-Appearing, Non-Toxic, Comfortable


Pain Distress: None


Mental Status: Positive for: Alert and Oriented X 3





- Systems Exam


Head: Present: Atraumatic, Normocephalic


Pupils: Present: PERRL


Extroacular Muscles: Present: EOMI


Conjunctiva: Present: Normal


Mouth: Present: Moist Mucous Membranes


Neck: Present: Normal Range of Motion


Respiratory/Chest: Present: Clear to Auscultation, Good Air Exchange.  No: 

Wheezes, Rhonchi


Cardiovascular: Present: Regular Rate and Rhythm, Normal S1, S2


Abdomen: No: Tenderness, Distention


Upper Extremity: Present: Edema.  No: Normal Inspection


Lower Extremity: Present: Edema.  No: Normal Inspection (+3 pitting edema 

bilaterally)


Neurological: Present: GCS=15, CN II-XII Intact, Speech Normal


Skin: Present: Warm, Normal Color


Psychiatric: Present: Alert, Oriented x 3, Normal Insight


Vital Signs











  Temp Pulse Resp BP Pulse Ox


 


 05/17/18 18:45  98.0 F  60  18  151/70 H  98


 


 05/17/18 18:35   61   153/87 H 


 


 05/17/18 17:38  98.0 F  60  20  153/90 H  100


 


 05/17/18 16:32     183/82 H 


 


 05/17/18 16:22   55 L  19  183/82 H  100


 


 05/17/18 14:36  97.5 F L  59 L  18  160/71 H  98


 


 05/17/18 12:56   60   177/71 H 


 


 05/17/18 12:35     177/71 H 


 


 05/17/18 09:37     188/81 H 


 


 05/17/18 09:16    19   97


 


 05/17/18 09:12  97.9 F  56 L  19  188/81 H  98


 


 05/17/18 09:08  97.9 F  56 L  18  188/81 H  98


 


 05/17/18 09:07  97.9 F  56 L  18  188/81 H  98














Medical Decision Making


Re-evaluation Time: 11:50





- Lab Interpretations


I have reviewed the lab results: Yes


Interpretation: Abnormal lab values (BNP 2580)





- EKG Interpretation


Interpreted by ED Physician: Yes


Type: 12 lead EKG


ED Course and Treatment: 





05/17/18 09:57


Dahlia Hamm is a 77 year old male whose presents to the emergency department 

for further evaluation after Chest CT abnormalities were found at PMD's office. 

In agreement with resident note which contains more details about the patient. 

Patient was seen and evaluated with resident. Came up with plan and treatment 

together.  (Byron Raygoza)


CBC, BMP, PT,PTT, BNP(2580) , Mg, Phos, Trop (<0.01)


Contacted Dr. Reddy; requests Dr. Campoverde, Dr. Shilo Villafana, Dr. Muse for 

cardiology, IR, and nephrology respectively. Will admit patient to Hospitalist 

service





05/17/18 09:30


BP still elevated; hydralazine administered will recheck BP; 177/71


Hypokelamic; repleted 





05/17/18 12:17


Echo


Hospitalist service accepts patient











 (Sean Louise)





- Lab Interpretations


Lab Results: 








 05/17/18 09:40 





 05/17/18 06:40 





 Lab Results





05/17/18 09:40: WBC 4.7, RBC 4.08, Hgb 11.3 L, Hct 33.8 L, MCV 82.8, MCH 27.7, 

MCHC 33.4, RDW 13.7, Plt Count 214, MPV 9.4, Gran % 46.3 L, Lymph % (Auto) 36.7 

H, Mono % (Auto) 8.5 H, Eos % (Auto) 7.2 H, Baso % (Auto) 1.3, Gran # 2.18, 

Lymph # (Auto) 1.7, Mono # (Auto) 0.4, Eos # (Auto) 0.3, Baso # (Auto) 0.06


05/17/18 06:40: Sodium 142, Potassium 3.4 L, Chloride 103, Carbon Dioxide 25, 

Anion Gap 18, BUN 37 H, Creatinine 2.2 H, Est GFR ( Amer) 35, Est GFR (

Non-Af Amer) 29, Random Glucose 135 H, Calcium 9.2, Phosphorus 3.6, Magnesium 

2.1, Troponin I < 0.01  D, NT-Pro-B Natriuret Pep 2580 H











- Medication Orders


Current Medication Orders: 








Aspirin (Ecotrin)  81 mg PO DAILY Formerly [facility], [facility]


   Last Admin: 05/18/18 09:55  Dose: 81 mg





Atorvastatin Calcium (Lipitor)  20 mg PO DIN Formerly [facility], [facility]


   Last Admin: 05/17/18 18:35  Dose: 20 mg





Furosemide (Lasix)  40 mg IVP Q12 Former[facility], [facility]


   Last Admin: 05/18/18 09:59  Dose: 40 mg





MAR Blood Pressure


 Document     05/18/18 09:59  MDU  (Rec: 05/18/18 10:00  [place] [id number])


     Blood Pressure


      Blood Pressure (100//90)             150/80


IVP Administration


 Document     05/18/18 09:59  MDU  (Rec: 05/18/18 10:00  [place] [id number])


     Charges for Administration


      # of IVP Administrations                   1





Hydralazine HCl (Apresoline)  10 mg IVP STAT [facility], [facility]


   Last Admin: 05/17/18 12:56  Dose: 10 mg





IVP Administration


 Document     05/17/18 12:56  [email or web address]  (Rec: 05/17/18 12:56  [email or web address]  [id number])


     Charges for Administration


      # of IVP Administrations                   1


MAR Pulse and Blood Pressure


 Document     05/17/18 12:56  CASTS1  (Rec: 05/17/18 12:56  CAST  [id number])


     Pulse


      Pulse Rate (60-90)                         60


     Blood Pressure


      Blood Pressure (100//90)             177/71





Hydralazine HCl (Apresoline)  10 mg IVP Q6 PRN


   PRN Reason: Systolic Blood Pressure


Potassium Chloride (Potassium Chloride 20 Meq/100 Ml)  20 meq in 100 mls @ 50 

mls/hr IVPB Q2H SPENCER


   Stop: 05/18/18 11:44


   Last Admin: 05/18/18 09:56  Dose: 50 mls/hr





eMAR Start Stop


 Document     05/18/18 09:56  [facility]  (Rec: 05/18/18 09:57  [id number]


     Intravenous Solution


      Start Date                                 05/18/18


      Start Time                                 10:00


      End Date                                   05/18/18


      End time                                   12:00


      Total Infusion Time                        120





Metoprolol Tartrate (Lopressor)  50 mg PO BID Formerly [facility], [facility]


   Last Admin: 05/18/18 09:55  Dose: 50 mg





MAR Pulse and Blood Pressure


 Document     05/18/18 09:55  MD  (Rec: 05/18/18 09:56  [facility]  [id number])


     Pulse


      Pulse Rate (60-90)                         61


     Blood Pressure


      Blood Pressure (100//90)             150/80





Non-Formulary Medication (Vitamin B Complex [B Complex])  1 tab PO DAILY Formerly [facility], [facility]


Sodium Bicarbonate (Sodium Bicarbonate Tab)  650 mg PO BID Former[facility], [facility]


   Last Admin: 05/18/18 09:55  Dose: 650 mg





Discontinued Medications





Furosemide (Lasix)  40 mg IVP STAT STA


   Stop: 05/17/18 09:14


   Last Admin: 05/17/18 09:37  Dose: 40 mg





MAR Blood Pressure


 Document     05/17/18 09:37  CAST  (Rec: 05/17/18 09:37  CAST  [id number])


     Blood Pressure


      Blood Pressure (100//90)             188/81


IVP Administration


 Document     05/17/18 09:37  CAST  (Rec: 05/17/18 09:37  CAST  [id number])


     Charges for Administration


      # of IVP Administrations                   1





Furosemide (Lasix)  20 mg IVP ONCE ONE


   Stop: 05/17/18 13:29


   Last Admin: 05/17/18 16:32  Dose: 20 mg





MAR Blood Pressure


 Document     05/17/18 16:32  CASTS1  (Rec: 05/17/18 16:32  CASTS1  [id number])


     Blood Pressure


      Blood Pressure (100//90)             183/82


IVP Administration


 Document     05/17/18 16:32  CASTS1  (Rec: 05/17/18 16:32  CASTS1  [id number])


     Charges for Administration


      # of IVP Administrations                   1





Potassium Chloride (Potassium Chloride 10 Meq/100 Ml)  10 meq in 100 mls @ 50 

mls/hr IVPB ONCE ONE


   Stop: 05/18/18 09:35


   Last Admin: 05/18/18 09:05  Dose: 50 mls/hr





eMAR Start Stop


 Document     05/18/18 09:05  MDU  (Rec: 05/18/18 09:06  MDU  [id number])


     Intravenous Solution


      Start Date                                 05/18/18


      Start Time                                 09:05


      End Date                                   05/18/18


      End time                                   11:05


      Total Infusion Time                        120





Pneumococcal Polyvalent Vaccine (Pneumovax 23 Vaccine)  0.5 ml IM .ONCE ONE


   Stop: 05/17/18 20:04


Potassium Chloride (K-Dur 20 Meq Er Tab)  20 meq PO STAT STA


   Stop: 05/17/18 10:07


   Last Admin: 05/17/18 12:56  Dose: 20 meq





Potassium Chloride (K-Dur 20 Meq Er Tab)  40 meq PO STAT STA


   Stop: 05/18/18 07:37


   Last Admin: 05/18/18 09:04  Dose: 40 meq





Potassium Chloride (K-Dur 20 Meq Er Tab)  40 meq PO ONCE ONE


   Stop: 05/18/18 11:01


Potassium Chloride (K-Dur 20 Meq Er Tab)  40 meq PO STAT STA


   Stop: 05/18/18 07:44


   Last Admin: 05/18/18 08:47  Dose:  











Disposition/Present on Arrival





- Present on Arrival


Any Indicators Present on Arrival: No


History of DVT/PE: No


History of Uncontrolled Diabetes: No


Urinary Catheter: No


History of Decub. Ulcer: No


History Surgical Site Infection Following: None





- Disposition


Have Diagnosis and Disposition been Completed?: Yes


Disposition Time: 12:13


Patient Plan: Admission





- Disposition


Diagnosis: 


 CHF exacerbation, Bilateral pleural effusion





Disposition: HOSPITALIZED


Patient Problems: 


 Current Active Problems











Problem Status Onset


 


Bilateral pleural effusion Acute  


 


CHF exacerbation Acute  











Condition: GUARDED

## 2018-05-17 NOTE — CP.PCM.PN
Subjective





- Date & Time of Evaluation


Date of Evaluation: 05/17/18


Time of Evaluation: 11:47





- Subjective


Subjective: 





PLEASE NOTE that this is NOT a complete note. PLEASE DO NOT USE as H&P until 

this note is signed:











Pharm: [facility] ([phone]


ED: /81 - Hydralazine 10 PO, Lasix 40 IV, K 20 meq. Hgb 11.3 (baseline 

13.1), Cr 2.2 (baseline 1.7) given K 3.4.  BNP 2580 (prev 509), BUN 37. trop 

negx1. 





Echo 7/17: EF 53.7%. LVH with good LV function. dilated LA. 


CT Chest: b/l large pleural effusions.








 97.9 F    56 L    18    188/81 H    98











Contacted Dr. Reddy; requests Dr. Campoverde, Dr. Shilo Villafana, Dr. Muse for 

cardiology, IR, and nephrology respectively. Will admit patient to Hospitalist 

service





Objective





- Vital Signs/Intake and Output


Vital Signs (last 24 hours): 


 











Temp Pulse Resp BP Pulse Ox


 


 97.9 F   56 L  19   188/81 H  97 


 


 05/17/18 09:12  05/17/18 09:12  05/17/18 09:16  05/17/18 09:37  05/17/18 09:16











- Medications


Medications: 


 Current Medications





Hydralazine HCl (Apresoline)  10 mg IVP STAT SPENCER

## 2018-05-18 LAB
ALBUMIN SERPL-MCNC: 3.9 G/DL (ref 3–4.8)
ALBUMIN/GLOB SERPL: 1.2 {RATIO} (ref 1.1–1.8)
ALT SERPL-CCNC: 18 U/L (ref 7–56)
APPEARANCE FLD: CLEAR
AST SERPL-CCNC: 20 U/L (ref 17–59)
BASOPHILS # BLD AUTO: 0.04 K/MM3 (ref 0–2)
BASOPHILS NFR BLD: 0.9 % (ref 0–3)
BODY FLD TYPE: (no result)
BODY FLUID MONO/MACROPHAGE: 0 % (ref 0–0)
BUN SERPL-MCNC: 36 MG/DL (ref 7–21)
CALCIUM SERPL-MCNC: 9.1 MG/DL (ref 8.4–10.5)
CELL CNT PNL FLD: 100 (ref 0–0)
EOSINOPHIL # BLD: 0.3 10*3/UL (ref 0–0.7)
EOSINOPHIL NFR BLD: 5.7 % (ref 1.5–5)
ERYTHROCYTE [DISTWIDTH] IN BLOOD BY AUTOMATED COUNT: 13.9 % (ref 11.5–14.5)
GFR NON-AFRICAN AMERICAN: 26
GRANULOCYTES # BLD: 2.28 10*3/UL (ref 1.4–6.5)
GRANULOCYTES NFR BLD: 51.9 % (ref 50–68)
HGB BLD-MCNC: 11.1 G/DL (ref 14–18)
LYMPHOCYTES # BLD: 1.4 10*3/UL (ref 1.2–3.4)
LYMPHOCYTES NFR BLD AUTO: 32.8 % (ref 22–35)
MCH RBC QN AUTO: 27.1 PG (ref 25–35)
MCHC RBC AUTO-ENTMCNC: 32.8 G/DL (ref 31–37)
MCV RBC AUTO: 82.6 FL (ref 80–105)
MONOCYTES # BLD AUTO: 0.4 10*3/UL (ref 0.1–0.6)
MONOCYTES NFR BLD: 8.7 % (ref 1–6)
PLATELET # BLD: 240 10^3/UL (ref 120–450)
PMV BLD AUTO: 9.9 FL (ref 7–11)
RBC # BLD AUTO: 4.09 10^6/UL (ref 3.5–6.1)
RBC # FLD AUTO: 19 /UL (ref 0–0)
WBC # BLD AUTO: 4.4 10^3/UL (ref 4.5–11)
WBC # FLD: 113 /UL (ref 0–300)

## 2018-05-18 RX ADMIN — IPRATROPIUM BROMIDE AND ALBUTEROL SULFATE SCH ML: .5; 3 SOLUTION RESPIRATORY (INHALATION) at 20:15

## 2018-05-18 NOTE — CON
DATE:  05/18/2018



CARDIOLOGY CONSULTATION



HISTORY OF PRESENT ILLNESS:  The patient is a 77-year-old male who presents

with exertional dyspnea as well as pedal edema.



PAST MEDICAL HISTORY:  Includes a history of hypertension,

hypercholesterolemia.  At his baseline, renal insufficiency and the patient

is status post coronary artery bypass surgery.  His last ejection fraction

measured in 2017 revealed an EF of greater than 60%.



He denies chest pain.



SOCIAL HISTORY:  The patient lives at home and used to be an .



REVIEW OF SYSTEMS:  A 14-point review of systems was reviewed in detail. 

Other than the pedal edema, there is dyspnea noted.



PHYSICAL EXAMINATION:

VITAL SIGNS:  Blood pressure is 150/80, heart rate is in the 60s.  Normal

sinus rhythm.

NECK:  Negative JVD.

LUNGS:  Decreased breath sounds bilaterally.

HEART:  Reveal S1, S2.

EXTREMITIES:  Trace edema.



LABORATORY DATA:  BUN and creatinine 17 and 2.4.  The potassium is 2.9,

hemoglobin is 11.1.



CT of the chest performed last week reveals pleural effusions.



IMPRESSION:

1.  Acute systolic congestive heart failure.

2.  Renal insufficiency.

3.  Pedal edema.

4.  Dyspnea.

5.  History of coronary artery bypass surgery.

6.  Hypertension.

7.  Hypercholesterolemia.



PLAN:  Given these findings, the patient has been placed on IV Lasix.  I

have discussed with the family about the possible deterioration of renal

function on the diuretics.



We will obtain an echocardiogram to evaluate his LV function.





__________________________________________

Shilo Campoverde MD



DD:  05/18/2018 10:59:14

DT:  05/18/2018 11:03:50

Job # [id number]

## 2018-05-18 NOTE — CP.PCM.PN
<Hannah Brooks - Last Filed: 05/18/18 11:12>





Subjective





- Date & Time of Evaluation


Date of Evaluation: 05/18/18


Time of Evaluation: 11:12





- Subjective


Subjective: 





Hannah Brooks, PGY1, Progress Note for Dr Lopez:





Pt seen and examined at bedside. No acute events overnight. Reports improved 

leg swelling. Denies fevers, chills, nausea, vomiting, sob, abdominal pain, 

urinary symptoms.





Objective





- Vital Signs/Intake and Output


Vital Signs (last 24 hours): 


 











Temp Pulse Resp BP Pulse Ox


 


 98.0 F   61   18   150/80   95 


 


 05/18/18 06:00  05/18/18 09:55  05/18/18 06:00  05/18/18 09:59  05/18/18 06:00








Intake and Output: 


 











 05/18/18 05/18/18





 06:59 18:59


 


Intake Total 120 


 


Output Total 500 


 


Balance -380 














- Medications


Medications: 


 Current Medications





Aspirin (Ecotrin)  81 mg PO DAILY [facility]


   Last Admin: 05/18/18 09:55 Dose:  81 mg


Atorvastatin Calcium (Lipitor)  20 mg PO DIN [facility]


   Last Admin: 05/17/18 18:35 Dose:  20 mg


Furosemide (Lasix)  40 mg IVP Q12 [facility]


   Last Admin: 05/18/18 09:59 Dose:  40 mg


Hydralazine HCl (Apresoline)  10 mg IVP STAT [facility]


   Last Admin: 05/17/18 12:56 Dose:  10 mg


Hydralazine HCl (Apresoline)  10 mg IVP Q6 PRN


   PRN Reason: Systolic Blood Pressure


Potassium Chloride (Potassium Chloride 20 Meq/100 Ml)  20 meq in 100 mls @ 50 

mls/hr IVPB Q2H [facility]


   Stop: 05/18/18 11:44


   Last Admin: 05/18/18 09:56 Dose:  50 mls/hr


Metoprolol Tartrate (Lopressor)  50 mg PO BID [facility]


   Last Admin: 05/18/18 09:55 Dose:  50 mg


Non-Formulary Medication (Vitamin B Complex [B Complex])  1 tab PO DAILY [facility]


Sodium Bicarbonate (Sodium Bicarbonate Tab)  650 mg PO BID [facility]


   Last Admin: 05/18/18 09:55 Dose:  650 mg











- Labs


Labs: 


 





 05/18/18 05:30 





 05/18/18 05:30 





 











PT  12.6 SECONDS (9.4-12.5)  H  05/17/18  11:20    


 


INR  1.09  (0.93-1.08)  H  05/17/18  11:20    


 


APTT  30.9 Seconds (25.1-36.5)   05/17/18  11:20    














- Constitutional


Appears: Non-toxic, No Acute Distress





- Head Exam


Head Exam: ATRAUMATIC, NORMOCEPHALIC





- Eye Exam


Eye Exam: EOMI, PERRL.  absent: Conjunctival injection, Nystagmus, Scleral 

icterus


Pupil Exam: NORMAL ACCOMODATION, PERRL.  absent: Fixed, Irregular, Miosis, 

Unequal





- ENT Exam


ENT Exam: Mucous Membranes Moist





- Neck Exam


Neck Exam: Full ROM





- Respiratory Exam


Respiratory Exam: absent: Accessory Muscle Use, Chest Wall Tenderness, Rales, 

Rhonchi, Wheezes, Respiratory Distress


Additional comments: 





+ decreased breath sounds bilaterally in the bases





- Cardiovascular Exam


Cardiovascular Exam: RRR, +S1, +S2.  absent: Murmur





- GI/Abdominal Exam


GI & Abdominal Exam: Soft, Normal Bowel Sounds.  absent: Guarding, Rigid, 

Tenderness, Mass, Organomegaly





- Extremities Exam


Extremities Exam: Pedal Edema (2+ b/l).  absent: Calf Tenderness





- Back Exam


Back Exam: NORMAL INSPECTION





- Neurological Exam


Neurological Exam: Alert, Awake, Oriented x3





- Psychiatric Exam


Psychiatric exam: Normal Affect, Normal Mood





- Skin


Skin Exam: Dry, Normal Color, Warm





Assessment and Plan





- Assessment and Plan (Free Text)


Assessment: 





77 year old male with PMH CAD, HTN, CKD, HLD, presents for exertional sob and 

lower extremity edema, found to be in CHF exacerbation with bilateral large 

pleural effusions:





Bilateral pleural effusions:


2/2 CHF exacerbation vs less likely malignancy, pneumonia


- BNP elevated


- Lasix 40 mg IV BID


- Cardio consulted - Dr Campoverde. F/u recs


- IR consulted. Scheduled today for fluid removal at 1 PM. Made pt NPO


- Echo 7/17: EF 53.7%. LVH with good LV function. dilated LA. 


- Echo


- CXR shows b/l pleural effusions.


- Strict I&Os, daily weights


- Monitor





Hypokalemia:


- repleted


- Monitor K at 2 PM.





CKD:


- Cr 2.4 today (previously a month ago Cr was reportedly 3.5)


- Avoid nephrotoxic agents


- Dr Muse consulted. appreciate recs.


- home sodium bicarb





Hx of CAD/HTN:


- home ASA 81, metoprolol.


- Hold home CCB in setting of leg swelling


- EKG shows S bradycardia HR 57, QTc 488 ms prolonged. 


- Monitor





Hx of HLD:


- home statin





PPX: pepcid, scds





Case seen and discussed with Dr Lopez.


Hannah Brooks, PGY1





<Alexandra Lopez - Last Filed: 05/18/18 15:13>





Objective





- Vital Signs/Intake and Output


Vital Signs (last 24 hours): 


 











Temp Pulse Resp BP Pulse Ox


 


 98.2 F   86   18   139/86   95 


 


 05/18/18 12:00  05/18/18 12:00  05/18/18 12:00  05/18/18 12:00  05/18/18 06:00








Intake and Output: 


 











 05/18/18 05/18/18





 06:59 18:59


 


Intake Total 120 


 


Output Total 500 


 


Balance -380 














- Medications


Medications: 


 Current Medications





Aspirin (Ecotrin)  81 mg PO DAILY [facility]


   Last Admin: 05/18/18 09:55 Dose:  81 mg


Atorvastatin Calcium (Lipitor)  20 mg PO DIN [facility]


   Last Admin: 05/17/18 18:35 Dose:  20 mg


Furosemide (Lasix)  40 mg IVP Q12 [facility]


   Last Admin: 05/18/18 09:59 Dose:  40 mg


Hydralazine HCl (Apresoline)  10 mg IVP Q6 PRN


   PRN Reason: Systolic Blood Pressure


Metoprolol Tartrate (Lopressor)  50 mg PO BID [facility]


   Last Admin: 05/18/18 09:55 Dose:  50 mg


Non-Formulary Medication (Vitamin B Complex [B Complex])  1 tab PO DAILY [facility]


Potassium Chloride (K-Dur 20 Meq Er Tab)  40 meq PO ONCE ONE


   Stop: 05/18/18 18:01


Sodium Bicarbonate (Sodium Bicarbonate Tab)  650 mg PO BID [facility]


   Last Admin: 05/18/18 09:55 Dose:  650 mg











- Labs


Labs: 


 





 05/18/18 05:30 





 05/18/18 05:30 





 











PT  12.6 SECONDS (9.4-12.5)  H  05/17/18  11:20    


 


INR  1.09  (0.93-1.08)  H  05/17/18  11:20    


 


APTT  30.9 Seconds (25.1-36.5)   05/17/18  11:20    














Attending/Attestation





- Attestation


I have personally seen and examined this patient.: Yes


I have fully participated in the care of the patient.: Yes


I have reviewed all pertinent clinical information, including history, physical 

exam and plan: Yes


Notes (Text): 





05/18/18 15:10








Attending note;





Patient seen and examined with resident.


Patient's wife by the bedside.





Patient is a77 Male with history of CAD and CABG (09/2017), hypertension, CKD 

GFR 15%, hyperlipidemia, presents for shortness of breath on exertion for past 

1 week. Patient also has leg swelling.


Acute systolic heart failure;   currently on IV Lasix.





 Hypokalemia; potassium supplementation ordered.





Patient had significant pleural effusion in the chest CT.


Repeat echocardiogram ordered. Cardiology evaluation appreciated.


Chronic kidney disease; creatinine is stable. Patient might have worsening 

renal function with aggressive diuresis.


Nephrology evaluation appreciated.





IR consult requested for diagnostic and therapeutic thoracentesis. Plan for 

thoracentesis today.





Case discussed with patient in detail.





Case discussed with patient's wife  and son in detail.





Upon discharge the patient will follow-up with PMD Dr. Jackson.

## 2018-05-18 NOTE — CP.PCM.PN
Subjective





- Date & Time of Evaluation


Date of Evaluation: 05/18/18


Time of Evaluation: 12:00





- Subjective


Subjective: 





78 yo M w/ pmh of CAD s/p CABG (9/2017), htn, and CKD IV, admitted with b/l 

moderate/large pleural effusions, [place];





Patient reports breathing well; per wife, patient ambulated without dyspnea 

this morning; urinating well on IV lasix;





Objective





- Vital Signs/Intake and Output


Vital Signs (last 24 hours): 


 











Temp Pulse Resp BP Pulse Ox


 


 98.2 F   86   18   139/86   95 


 


 05/18/18 12:00  05/18/18 12:00  05/18/18 12:00  05/18/18 12:00  05/18/18 06:00








Intake and Output: 


 











 05/18/18 05/18/18





 06:59 18:59


 


Intake Total 120 


 


Output Total 500 


 


Balance -380 














- Medications


Medications: 


 Current Medications





Aspirin (Ecotrin)  81 mg PO DAILY [facility]


   Last Admin: 05/18/18 09:55 Dose:  81 mg


Atorvastatin Calcium (Lipitor)  20 mg PO DIN [facility]


   Last Admin: 05/17/18 18:35 Dose:  20 mg


Ergocalciferol (Drisdol 50,000 Intl Units Cap)  1 cap PO Q7D [facility]


Furosemide (Lasix)  40 mg IVP Q12 [facility]


   Last Admin: 05/18/18 09:59 Dose:  40 mg


Hydralazine HCl (Apresoline)  10 mg IVP Q6 PRN


   PRN Reason: Systolic Blood Pressure


Metoprolol Tartrate (Lopressor)  50 mg PO BID [facility]


   Last Admin: 05/18/18 09:55 Dose:  50 mg


Non-Formulary Medication (Vitamin B Complex [B Complex])  1 tab PO DAILY [facility]


Potassium Chloride (K-Dur 20 Meq Er Tab)  40 meq PO ONCE ONE


   Stop: 05/18/18 18:01


Sodium Bicarbonate (Sodium Bicarbonate Tab)  650 mg PO BID [facility]


   Last Admin: 05/18/18 09:55 Dose:  650 mg











- Labs


Labs: 


 





 05/18/18 05:30 





 05/18/18 05:30 





 











PT  12.6 SECONDS (9.4-12.5)  H  05/17/18  11:20    


 


INR  1.09  (0.93-1.08)  H  05/17/18  11:20    


 


APTT  30.9 Seconds (25.1-36.5)   05/17/18  11:20    














- Constitutional


Appears: Non-toxic, No Acute Distress





- Eye Exam


Eye Exam: Normal appearance.  absent: Scleral icterus





- ENT Exam


ENT Exam: Mucous Membranes Moist





- Respiratory Exam


Respiratory Exam: absent: Respiratory Distress


Additional comments: 





Decreased breath sounds at b/l bases;





- Cardiovascular Exam


Cardiovascular Exam: JVD, RRR, +S1, +S2





- GI/Abdominal Exam


GI & Abdominal Exam: Soft.  absent: Distended, Tenderness





- Extremities Exam


Additional comments: 





marked b/l lower leg edema, improved;





- Neurological Exam


Neurological Exam: Alert, Awake





- Psychiatric Exam


Psychiatric exam: Normal Mood.  absent: Agitated





- Skin


Skin Exam: Warm.  absent: Cyanosis





Assessment and Plan


(1) CHF exacerbation


Assessment & Plan: 


Normal systolic function on previous echo on record from before CABG, awaiting 

repeat echo; patient responding well to IV lasix 40 mg q12h, continue; ensuing 

hypokalemia corrected by primary team, continue to keep ~4.0;


Status: Acute   





(2) Bilateral pleural effusion


Assessment & Plan: 


s/p R thoracentesis today with 1700 cc drained, awaiting fluid analysis; 


Status: Acute   





(3) Hypertensive CKD (chronic kidney disease)


Assessment & Plan: 


BP still elevated today; only on metoprolol 50 mg bid and IV lasix; will 

restart amlodipine; if renal function remains stable, will add small dose of ARB

;


Status: Acute   





(4) CKD (chronic kidney disease) stage 4, GFR 15-29 ml/min


Assessment & Plan: 


Overall improved renal function since after CABG; 24 hr urine sent for UPEP as 

patient had somewhat elevated kappa light chains; will f/u;


Status: Chronic   





(5) Anemia of renal disease


Assessment & Plan: 


Hgb stable, monitor;


Status: Chronic   





(6) Chronic kidney disease-mineral and bone disorder


Assessment & Plan: 


Low 25-OH vit D level, awaiting PTH; start ergocalciferol 50,000 u weekly;


Status: Chronic   





(7) Hypokalemia


Assessment & Plan: 


see above;


Status: Acute

## 2018-05-18 NOTE — CON
DATE:



NEPHROLOGY CONSULTATION



LOCATION:  [facility].



HISTORY OF PRESENT ILLNESS:  Patient is a 77-year-old male with past

medical history of hypertension, CAD, status post stent 10 years ago,

status post CABG in 09/2017; CKD, stage IV, presented after being found to

have extensive bilateral pleural effusions on CAT scan; Nephrology is being

consulted for advanced renal insufficiency.



History obtained from both the patient and his wife who was at bedside. 

Patient reportedly with increasing dyspnea on exertion getting short of

breath just walking less than half a block; also with progressively

increasing bilateral lower leg edema; otherwise, denies any chest pains or

palpitations; denies any change in urination (not decreased).



Patient reports good appetite and good p.o. dietary intake; wife says that

he has been lethargic, although patient denies this; patient denies any

altered taste sensorium.



PAST MEDICAL HISTORY:  As above.



SOCIAL HISTORY:  Denies ever smoking.



FAMILY HISTORY:  _____.



REVIEW OF SYSTEMS:

CONSTITUTIONAL:  No reported weight loss.

HEENT:  Reports vision stable.

RESPIRATORY:  As per HPI.

CARDIOVASCULAR:  As per HPI.

GASTROINTESTINAL:  No nausea, vomiting or diarrhea.

GENITOURINARY:  As per HPI.  No dysuria.

EXTREMITIES:  As per HPI.

NEUROLOGIC:  Denies any numbness in feet.  Denies any headaches or

dizziness.



PHYSICAL EXAMINATION:

VITAL SIGNS:  Blood pressure, this afternoon was 177/71, heart rate 60,

respirations 18, temperature 97.5, O2 sat 98% on room air.

GENERAL:  No distress.  Conversing coherently in full sentences.

HEENT:  Moist mucous membranes.  Nonicteric.  Elevated JVD.

RESPIRATORY:  No distress.  No overt rhonchi or rales.

CARDIOVASCULAR:  Heart sounds S1 and S2 normal.  No murmurs.  No gallops. 

No rubs.

GASTROINTESTINAL:  Abdomen soft, nontender, nondistended.

GENITOURINARY:  No bladder distention.

EXTREMITIES:  3+ bilateral lower leg edema.

SKIN:  Warm.  No cyanosis.

NEUROLOGIC:  No asterixis.

PSYCHIATRIC:  Normal mood, normal affect.



LABORATORY DATA  CBC:  WBC 4.7, hemoglobin 11.3, hematocrit 33.8, platelets

240.



Chemistry panel:  Sodium 142, potassium 3.4, chloride 103, bicarb 25, BUN

37, creatinine 2.2, glucose 135, calcium 9.2, phosphorus 3.6, magnesium

2.1, iron 48, TIBC 289, saturation 17, ferritin 70.7.  ProBNP 2580.



Chest x-ray directly visualized shows some questionable pulmonary vascular

congestion, otherwise bilateral pleural effusions.



ASSESSMENT AND PLAN:

1.  Chronic kidney disease, stage IV:  Renal function has actually improved

status post coronary artery bypass graft; when creatinine had increased

from baseline of 1.7 to about 3.5; improvement has been seen over the last

few months with creatinine slowly trending downward to 2.2; there is some

concern that patient may be actually losing muscle mass, which may account

for the relative decrease in serum creatinine; we will need to get the

standing weight.



Otherwise relatively stable electrolyte status.



I agree with IV diuresis for now with Lasix 40 mg every 12 hours.



Obtain repeat echo.



Replenish potassium with goal of keeping close to 4 considering cardiac

history.



2.  Hypertensive chronic kidney disease:  Blood pressure markedly elevated

likely due to volume overload.  Continue home meds of amlodipine 10 mg

daily, metoprolol 50 mg b.i.d. and diuretics as above.

3.  Anemia:  Has some evidence of iron deficiency, but hemoglobin is

overall at goal of 10 to 11 g for chronic kidney disease; continue p.o.

ferrous sulfate.

4.  Chronic kidney disease - mineral bone disorder.  Patient with mildly

elevated PTH seen previously.  We will repeat along with 25-hydroxy vitamin

D level.

5.  Pleural effusions seen bilaterally while congestive heart failure needs

to be considered.  We would recommend to get diagnostic thoracentesis as

volume overload is not consistent with his improvement in renal function.



Thank you for this referral.  We will be following up closely.





__________________________________________

Adryan Muse MD



DD:  05/17/2018 16:39:50

DT:  05/17/2018 16:43:41

Job # [id number]

## 2018-05-18 NOTE — US
PROCEDURE:  Ultrasound guided right thoracentesis.



CLINICAL HISTORY:  Congestive heart failure.  Moderate large 

bilateral pleural effusions.  Shortness of breath.  Needs 

thoracentesis 



PHYSICIAN(S):  Shilo Villafana MD.



TECHNIQUE:

The relative risks and indications of the procedure were explained to 

the patient and consent obtained. The patient was placed in a sitting 

position on the stretcher and sonography of the right chest 

performed. This revealed a moderate to large rightpleural effusion.



A right posterolateral intercostal approach was selected and the area 

prepped and draped usual sterile fashion. 1% Xylocaine was used to 

anesthetize the skin and soft tissues. A 7 Ethiopian thoracentesis 

catheter was trocared into the right pleural cavity and 1700 ccof 

clear straw-colored fluid aspirated. Specimens were sent to the lab.



IMPRESSION:

1. Ultrasound guided right thoracentesis. 1700cc of clear, 

straw-colored fluid were aspirated. The appropriate labs were sent.

## 2018-05-19 VITALS — HEART RATE: 63 BPM | DIASTOLIC BLOOD PRESSURE: 70 MMHG | SYSTOLIC BLOOD PRESSURE: 147 MMHG

## 2018-05-19 VITALS — TEMPERATURE: 98.6 F | OXYGEN SATURATION: 99 %

## 2018-05-19 VITALS — RESPIRATION RATE: 20 BRPM

## 2018-05-19 LAB
ALBUMIN SERPL-MCNC: 3.9 G/DL (ref 3–4.8)
ALBUMIN/GLOB SERPL: 1.1 {RATIO} (ref 1.1–1.8)
ALT SERPL-CCNC: 15 U/L (ref 7–56)
AST SERPL-CCNC: 22 U/L (ref 17–59)
BASOPHILS # BLD AUTO: 0.02 K/MM3 (ref 0–2)
BASOPHILS NFR BLD: 0.1 % (ref 0–3)
BUN SERPL-MCNC: 42 MG/DL (ref 7–21)
CALCIUM SERPL-MCNC: 9.4 MG/DL (ref 8.4–10.5)
COLLECT DURATION TIME UR: 24 HOURS
CREAT 24H UR-MRATE: (no result) G/24 H (ref 0.63–2.5)
CREAT UR-MCNC: 20.7 MG/DL
EOSINOPHIL # BLD: 0 10*3/UL (ref 0–0.7)
EOSINOPHIL NFR BLD: 0 % (ref 1.5–5)
ERYTHROCYTE [DISTWIDTH] IN BLOOD BY AUTOMATED COUNT: 14.3 % (ref 11.5–14.5)
GFR NON-AFRICAN AMERICAN: 22
GRANULOCYTES # BLD: 12.89 10*3/UL (ref 1.4–6.5)
GRANULOCYTES NFR BLD: 83 % (ref 50–68)
HGB BLD-MCNC: 12.9 G/DL (ref 14–18)
LYMPHOCYTES # BLD: 1.8 10*3/UL (ref 1.2–3.4)
LYMPHOCYTES NFR BLD AUTO: 11.7 % (ref 22–35)
MCH RBC QN AUTO: 27.8 PG (ref 25–35)
MCHC RBC AUTO-ENTMCNC: 33.6 G/DL (ref 31–37)
MCV RBC AUTO: 82.8 FL (ref 80–105)
MONOCYTES # BLD AUTO: 0.8 10*3/UL (ref 0.1–0.6)
MONOCYTES NFR BLD: 5.2 % (ref 1–6)
PLATELET # BLD: 239 10^3/UL (ref 120–450)
PMV BLD AUTO: 9.7 FL (ref 7–11)
RBC # BLD AUTO: 4.64 10^6/UL (ref 3.5–6.1)
URINE CREATININE CLEARANCE: 12 ML/MIN (ref 80–120)
URINE TOTAL VOLUME: 2525 ML (ref 800–1400)
WBC # BLD AUTO: 15.5 10^3/UL (ref 4.5–11)

## 2018-05-19 RX ADMIN — IPRATROPIUM BROMIDE AND ALBUTEROL SULFATE SCH ML: .5; 3 SOLUTION RESPIRATORY (INHALATION) at 07:38

## 2018-05-19 RX ADMIN — IPRATROPIUM BROMIDE AND ALBUTEROL SULFATE SCH ML: .5; 3 SOLUTION RESPIRATORY (INHALATION) at 02:40

## 2018-05-19 RX ADMIN — IPRATROPIUM BROMIDE AND ALBUTEROL SULFATE SCH ML: .5; 3 SOLUTION RESPIRATORY (INHALATION) at 13:41

## 2018-05-19 NOTE — RAD
HISTORY:

pleuritic pain, s/p thoracentesis  



Relevant interventional procedure(s): May 18, 2018. Right 

thoracentesis with recovery of 1.7 L of fluid 



COMPARISON:

05/17/2018. 



FINDINGS:



LUNGS:

Improved aeration of the right lung status post thoracentesis.



Stable compressive atelectasis left pleural effusion.



PLEURA:

Substantial decrease without complete resolution of right pleural 

effusion. No pneumothorax.



Persistent large left pleural effusion.



CARDIOVASCULAR:

Normal.



OSSEOUS STRUCTURES:

No significant abnormalities.



VISUALIZED UPPER ABDOMEN:

Normal.



OTHER FINDINGS:

None.



IMPRESSION:

No adverse findings/ no pneumothorax following right thoracentesis.

## 2018-05-19 NOTE — RAD
HISTORY:

rt thora  



COMPARISON:

May 18, 2018. 



TECHNIQUE:

Chest PA and lateral



FINDINGS:



LUNGS:

Stable findings with respect to compressive atelectasis right lower 

lobe and to a greater extent left lower lobe.



PLEURA:

Stable bilateral pleural effusions not left larger than right.  No 

right pneumothorax status post thoracentesis.



CARDIOVASCULAR:

 No radiographic findings to suggest acute or significant 

cardiovascular disease.



OSSEOUS STRUCTURES:

No significant abnormalities.



VISUALIZED UPPER ABDOMEN:

Normal.



OTHER FINDINGS:

None.



IMPRESSION:

No significant interval change compared to the prior examination(s).

## 2018-05-19 NOTE — CON
DATE:  05/19/2018



PULMONARY CONSULTATION



LOCATION:  Room 263.



HISTORY OF PRESENT ILLNESS:  Mr. Hamm is a 77-year-old gentleman with a

long previous medical history.  He has significant coronary artery disease

with congestive heart failure.  He underwent coronary artery bypass surgery

in 09/2017.  He has a longstanding history of hypertension, hyperlipidemia.



He became short of breath over the past week with progressive shortness of

breath on walking.  He was seen by his primary doctor, Dr. Reddy, who

admitted him for evaluation of dyspnea and orthopnea.  He had an outpatient

CT of the chest, which showed bilateral pleural effusions and pericardial

effusions.  He was sent to the hospital for further evaluation and

treatment.



ALLERGIES:  HE HAS NO KNOWN ALLERGIES.



PAST SURGICAL HISTORY:  CABG and stents discussed above.



FAMILY HISTORY:  Hypertension.



SOCIAL HISTORY:  No alcohol or tobacco.  No travel history to his home

country.  An  with no significant occupational exposure.



REVIEW OF SYSTEMS:  No significant problems other than that is related to

his coronary artery disease.  He has had edema of the lower extremities

with chronic kidney disease as well.  Dyspnea on exertion. All other systems 
negative. 



PHYSICAL EXAMINATION:

GENERAL:  The patient is comfortable, in no acute respiratory distress.  On

discussion, he had history of intermittent shortness of breath as described

above with some sticking pain, which is intermittent, this is no longer

present.

VITAL SIGNS:  Stable.  He is afebrile, pulse 60, respiratory rate 16, blood

pressure 160/70, pulse ox 98% on room air.

NECK:  Supple.  No JVD.  No lymphadenopathy.

HEENT:  Normocephalic, atraumatic.

HEART:  S1, S2.  Soft systolic ejection murmur.  Slight soft gallop.

CHEST:  Global decrease in breath sounds.  Decreased breath sounds at both

bases, left greater than right.

ABDOMEN:  Soft.  Bowel sounds normoactive without mass, guarding, rebound

or organomegaly.

EXTREMITIES:  Reveal no clubbing or cyanosis.  There is edema of both legs.

:  Within normal limits

NEUROLOGIC:  No focal findings.  Awake, alert, oriented, doing well.

SKIN:  Warm, dry.  No rash or excoriation.

LYMPHATICS:  Lymphadenopathy is not present.  No lymph nodes palpated in

the supraclavicular notch and the inguinal, axillary or cervical area.



LABORATORY STUDIES:  Show white count of 4000, hemoglobin of 11.3,

hematocrit 33, potassium was slightly low, platelet count 135,000.  BNP was

extremely elevated.



IMPRESSION:  The patient was currently treated for pulmonary vascular

congestion and being followed by Dr. Shilo Campoverde.  The echocardiogram

results are not yet available.  Findings consistent with the following,

1.  Bilateral pleural effusions.

2.  Pulmonary vascular congestion.

3.  Congestive heart failure.

4.  Coronary artery disease.

5.  Chronic kidney disease.

6.  Hypertension.



PLAN:

1.  Await pleural effusion analysis.

2.  Repeat BMP.

3.  Continue diuretic and further cardioactive medications as per Dr. Campoverde.



The patient already had a diagnostic thoracentesis by Dr. Shilo Villafana.  The fluid was clear.  The results of this fluid are not yet

available.  It appears from the color and consistency that this was

probably a transudate, although complete workup has not returned from the

laboratory as of yet.  Etiology remains unclear.



We have discussed the case at length with Dr. Lopez, the attending

physician.  I believe that the problems are related to coronary artery

disease and chronic congestive heart failure.  I do not believe this is

post cardiotomy syndrome.  The previous surgery was in 09/2017.  The

pleural effusions have been monitored by Dr. Reddy over time and they

have been increasing slowly.  Further diuresis if the patient is

symptomatic.



Further, we will evaluate the fluid and decide the exact etiology from

chemistries, cell count, microbiology, etc.  We will discuss with Dr. Lopez and Dr. Reddy when these results are available.  We will

follow closely with you and decide on the need for further intervention. 

We will discuss with you.



Thank you for the opportunity to see this gentleman.





__________________________________________

Justice Weiss MD





DD:  05/19/2018 12:47:19

DT:  05/19/2018 12:51:52

Job # [id number]



MTDD

## 2018-05-19 NOTE — PN
DATE:  05/19/2018



CARDIOLOGY FOLLOWUP



SUBJECTIVE:  The patient is status post thoracentesis.



PHYSICAL EXAMINATION:

VITAL SIGNS:  Blood pressure is 160/70, heart rates in the 60s.

NECK:  Negative JVD.

LUNGS:  Decreased breath sounds.

HEART:  Reveals S1 and S2.

EXTREMITIES:  Without edema.



LABORATORY DATA:  BUN and creatinine are 42 and 2.8.  Hemoglobin is 12.9.



IMPRESSION:

1.  Large pleural effusions.

2.  Status post coronary artery bypass surgery.

3.  Coronary artery disease.

4.  Dyspnea.

5.  Congestive heart failure.



PLAN:  Given these findings, we will review his LV function by

echocardiogram today.





__________________________________________

Shilo Campoverde MD







DD:  05/19/2018 10:41:12

DT:  05/19/2018 10:47:59

Job # [id number]

## 2018-05-19 NOTE — CARD
--------------- APPROVED REPORT --------------





EXAM: Two-dimensional and M-mode echocardiogram with Doppler and 

color Doppler.



INDICATION

Congestive Heart Failure 



2D DIMENSIONS 

Left Atrium (2D)4.3   (1.6-4.0cm)IVSd1.2   (0.7-1.1cm)

LVDd4.3   (3.9-5.9cm)PWd1.1   (0.7-1.1cm)

LVDs2.9   (2.5-4.0cm)FS (%) 33.8   %

LVEF (%)63.0   (>50%)



M-Mode DIMENSIONS 

Aortic Root3.20   (2.2-3.7cm)Aortic Cusp Exc.1.50   (1.5-2.0cm)



Aortic Valve

AoV Peak Fcfqzgci409.0cm/sAoV VTI48.7cmAO Peak GR.16mmHg

AO Mean GR.9mmHg



Mitral Valve

MV E Owjzauhf209.0cm/sMV A Ydvueivc453.0cm/sE/A ratio1.0



TDI

Lateral E' Peak V7.02cm/sMedial E' Peak V5.07cm/sE/Lateral E'17.1

E/Medial E'23.7



Pulmonary Valve

PV Peak Fbqqlrto61.5cm/sPV Peak Grad.2mmHg



Tricuspid Valve

TR Peak Gtvmilsk185fl/sRAP BEPDQHWC26fpFvUS Peak Gr.42mmHg

[id number]



 LEFT VENTRICLE 

The left ventricle is normal size. There is normal left ventricular 

wall thickness. The left ventricular function is normal. The left 

ventricular ejection fraction is within the normal range. There is 

normal LV segmental wall motion. Transmitral Doppler flow pattern is 

Grade I-abnormal relaxation pattern.



 RIGHT VENTRICLE 

The right ventricle is normal size. There is normal right ventricular 

wall thickness. The right ventricular systolic function is normal.



 ATRIA 

The left atrium is mildly dilated. The right atrium is mildly dilated.



 AORTIC VALVE 

The aortic valve is normal in structure. No aortic regurgitation is 

present. There is no aortic valvular stenosis.



 MITRAL VALVE 

The mitral valve is normal in structure. There is no mitral valve 

regurgitation noted. There is no mitral valve stenosis.



 TRICUSPID VALVE 

There is mild tricuspid regurgitation. There is mild to moderate 

pulmonary hypertension.



 PULMONIC VALVE 

There is trace pulmonic valvular regurgitation. 



 GREAT VESSELS 

The aortic root is normal in size.



 PERICARDIAL EFFUSION 

There is large left pleural effusion. There is a small-moderate 

circumferential pericardial effusion.



<Conclusion>

The left ventricle is normal size.

There is normal left ventricular wall thickness.

The left ventricular function is normal.

The left ventricular ejection fraction is within the normal range.

There is normal LV segmental wall motion.

Transmitral Doppler flow pattern is Grade I-abnormal relaxation 

pattern.

There is mild tricuspid regurgitation.

There is mild to moderate pulmonary hypertension.

There is large left pleural effusion.

There is a small-moderate circumferential pericardial effusion. No 

Tamponade

## 2018-05-19 NOTE — CP.PCM.PN
Objective





- Vital Signs/Intake and Output


Vital Signs (last 24 hours): 


 











Temp Pulse Resp BP Pulse Ox


 


 98.6 F   63   20   159/77 H  99 


 


 05/19/18 06:00  05/19/18 10:00  05/19/18 06:00  05/19/18 09:29  05/19/18 06:00








Intake and Output: 


 











 05/19/18 05/19/18





 06:59 18:59


 


Intake Total 420 


 


Output Total 250 


 


Balance 170 














- Medications


Medications: 


 Current Medications





Albuterol/Ipratropium (Duoneb 3 Mg/0.5 Mg (3 Ml) Ud)  3 ml IH [id number] [facility]


   Last Admin: 05/19/18 13:41 Dose:  3 ml


Amlodipine Besylate (Norvasc)  5 mg PO DAILY [facility]


   Last Admin: 05/19/18 09:29 Dose:  5 mg


Aspirin (Ecotrin)  81 mg PO DAILY [facility]


   Last Admin: 05/19/18 09:29 Dose:  81 mg


Atorvastatin Calcium (Lipitor)  20 mg PO DIN [facility]


   Last Admin: 05/18/18 18:10 Dose:  20 mg


Benzonatate (Tessalon Perles)  100 mg PO TID [facility]


   Last Admin: 05/19/18 14:26 Dose:  100 mg


Ergocalciferol (Drisdol 50,000 Intl Units Cap)  1 cap PO Q7D [facility]


   Last Admin: 05/18/18 18:55 Dose:  Not Given


Furosemide (Lasix)  40 mg PO BID [facility]


Guaifenesin (Robitussin)  100 mg PO Q4H PRN


   PRN Reason: Cough


   Last Admin: 05/18/18 19:02 Dose:  100 mg


Hydralazine HCl (Apresoline)  10 mg IVP Q6 PRN


   PRN Reason: Systolic Blood Pressure


   Last Admin: 05/18/18 17:00 Dose:  10 mg


Hydralazine HCl (Apresoline)  15 mg PO BID [facility]


Isosorbide Mononitrate (Imdur Er)  15 mg PO DAILY [facility]


Non-Formulary Medication (Vitamin B Complex [B Complex])  1 tab PO DAILY [facility]


   Last Admin: 05/19/18 09:30 Dose:  Not Given


Sodium Bicarbonate (Sodium Bicarbonate Tab)  650 mg PO BID [facility]


   Last Admin: 05/19/18 09:29 Dose:  650 mg











- Labs


Labs: 


 





 05/19/18 06:00 





 05/19/18 12:00 





 











PT  12.6 SECONDS (9.4-12.5)  H  05/17/18  11:20    


 


INR  1.09  (0.93-1.08)  H  05/17/18  11:20    


 


APTT  30.9 Seconds (25.1-36.5)   05/17/18  11:20    














Assessment and Plan


(1) CHF exacerbation


Status: Acute   





(2) Bilateral pleural effusion


Status: Acute   





(3) Hypertensive CKD (chronic kidney disease)


Status: Acute   





(4) CKD (chronic kidney disease) stage 4, GFR 15-29 ml/min


Status: Chronic   





(5) Anemia of renal disease


Status: Chronic   





(6) Chronic kidney disease-mineral and bone disorder


Status: Chronic   





(7) Hypokalemia


Status: Acute

## 2018-05-19 NOTE — CP.PCM.DIS
<Clark Steele - Last Filed: 05/20/18 06:53>





Provider





- Provider


Date of Admission: 


05/17/18 10:36





Attending physician: 


Alexandra Lopez MD





Consults: 





Ilan Campoverde


Nephro - Mughni


IR - Smith


Pulm - Karpman


Time Spent in preparation of Discharge (in minutes): 65





Diagnosis





- Discharge Diagnosis


(1) Bilateral pleural effusion


Status: Acute   





(2) CHF exacerbation


Status: Acute   





(3) Hypertensive CKD (chronic kidney disease)


Status: Chronic   





Hospital Course





- Lab Results


Lab Results: 


 Most Recent Lab Values











WBC  15.5 10^3/ul (4.5-11.0)  H D 05/19/18  06:00    


 


RBC  4.64 10^6/uL (3.5-6.1)   05/19/18  06:00    


 


Hgb  12.9 g/dL (14.0-18.0)  L  05/19/18  06:00    


 


Hct  38.4 % (42.0-52.0)  L  05/19/18  06:00    


 


MCV  82.8 fl (80.0-105.0)   05/19/18  06:00    


 


MCH  27.8 pg (25.0-35.0)   05/19/18  06:00    


 


MCHC  33.6 g/dl (31.0-37.0)   05/19/18  06:00    


 


RDW  14.3 % (11.5-14.5)   05/19/18  06:00    


 


Plt Count  239 10^3/uL (120.0-450.0)   05/19/18  06:00    


 


MPV  9.7 fl (7.0-11.0)   05/19/18  06:00    


 


Gran %  83.0 % (50.0-68.0)  H  05/19/18  06:00    


 


Lymph % (Auto)  11.7 % (22.0-35.0)  L  05/19/18  06:00    


 


Mono % (Auto)  5.2 % (1.0-6.0)   05/19/18  06:00    


 


Eos % (Auto)  0.0 % (1.5-5.0)  L  05/19/18  06:00    


 


Baso % (Auto)  0.1 % (0.0-3.0)   05/19/18  06:00    


 


Gran #  12.89  (1.4-6.5)  H  05/19/18  06:00    


 


Lymph # (Auto)  1.8  (1.2-3.4)   05/19/18  06:00    


 


Mono # (Auto)  0.8  (0.1-0.6)  H  05/19/18  06:00    


 


Eos # (Auto)  0.0  (0.0-0.7)   05/19/18  06:00    


 


Baso # (Auto)  0.02 K/mm3 (0.0-2.0)   05/19/18  06:00    


 


PT  12.6 SECONDS (9.4-12.5)  H  05/17/18  11:20    


 


INR  1.09  (0.93-1.08)  H  05/17/18  11:20    


 


APTT  30.9 Seconds (25.1-36.5)   05/17/18  11:20    


 


Sodium  142 mmol/L (132-148)   05/19/18  06:00    


 


Potassium  4.0 mmol/L (3.6-5.0)   05/19/18  06:00    


 


Chloride  102 mmol/L ()   05/19/18  06:00    


 


Carbon Dioxide  24 mmol/L (21-33)   05/19/18  06:00    


 


Anion Gap  20  (10-20)   05/19/18  06:00    


 


BUN  42 mg/dL (7-21)  H  05/19/18  06:00    


 


Creatinine  2.8 mg/dl (0.8-1.5)  H  05/19/18  12:00    


 


Est GFR ( Amer)  27   05/19/18  06:00    


 


Est GFR (Non-Af Amer)  22   05/19/18  06:00    


 


Random Glucose  121 mg/dL ()  H  05/19/18  06:00    


 


Calcium  9.4 mg/dL (8.4-10.5)   05/19/18  06:00    


 


Phosphorus  3.6 mg/dL (2.5-4.5)   05/17/18  06:40    


 


Magnesium  2.1 mg/dL (1.7-2.2)   05/17/18  06:40    


 


Iron  48 ug/dL ()   05/17/18  12:00    


 


TIBC  289 ug/dL (261-462)   05/17/18  12:00    


 


% Saturation  17 % (20-55)  L  05/17/18  12:00    


 


Ferritin  70.7 ng/mL  05/17/18  12:00    


 


Total Bilirubin  1.2 mg/dL (0.2-1.3)   05/19/18  06:00    


 


AST  22 U/L (17-59)   05/19/18  06:00    


 


ALT  15 U/L (7-56)   05/19/18  06:00    


 


Alkaline Phosphatase  51 U/L ()   05/19/18  06:00    


 


Troponin I  < 0.01 ng/mL D 05/17/18  06:40    


 


NT-Pro-B Natriuret Pep  2580 pg/mL (0-450)  H  05/17/18  06:40    


 


Total Protein  7.4 g/dL (5.8-8.3)   05/19/18  06:00    


 


Albumin  3.9 g/dL (3.0-4.8)   05/19/18  06:00    


 


Globulin  3.5 gm/dL  05/19/18  06:00    


 


Albumin/Globulin Ratio  1.1  (1.1-1.8)   05/19/18  06:00    


 


25-OH Vitamin D Total  14.1 NG/ML (30.0-100.0)  L  05/18/18  05:30    


 


Urine Color  Yellow  (YELLOW)   05/17/18  11:58    


 


Urine Appearance  Clear  (CLEAR)   05/17/18  11:58    


 


Urine pH  7.0  (4.7-8.0)   05/17/18  11:58    


 


Ur Specific Gravity  1.010  (1.005-1.035)   05/17/18  11:58    


 


Urine Protein  30 mg/dL (<30 mg/dL)  H  05/17/18  11:58    


 


Urine Glucose (UA)  Negative mg/dL (NEGATIVE)   05/17/18  11:58    


 


Urine Ketones  Negative mg/dL (NEGATIVE)   05/17/18  11:58    


 


Urine Blood  Trace-lysed  (NEGATIVE)  H  05/17/18  11:58    


 


Urine Nitrate  Negative  (NEGATIVE)   05/17/18  11:58    


 


Urine Bilirubin  Negative  (NEGATIVE)   05/17/18  11:58    


 


Urine Urobilinogen  0.2 E.U./dL (<1 E.U./dL)   05/17/18  11:58    


 


Ur Leukocyte Esterase  Negative Haley/uL (NEGATIVE)   05/17/18  11:58    


 


Urine RBC  1 - 3 /hpf (0-2)   05/17/18  11:58    


 


Urine WBC  0 - 2 /hpf (0-6)   05/17/18  11:58    


 


Ur Epithelial Cells  None /hpf (0-5)   05/17/18  11:58    


 


Urine Bacteria  Few  (NEG)   05/17/18  11:58    


 


U Random Total Protein  36 mg/L  05/19/18  12:00    


 


Urine Collection Time  24 HOURS  05/19/18  12:00    


 


Urine Total Volume  2525 mL (800-1400)  H  05/19/18  12:00    


 


Urine Creatinine  0.27 g/L  05/18/18  19:04    


 


Ur Creatinine 24 Hour  See note g/24 h (0.63-2.50)   05/18/18  19:04    


 


Creatinine Clearance  12.0 ml/min ()  L  05/19/18  12:00    


 


Ur Total Protein 24 Hr  See note mg/24 h (<150)   05/18/18  19:04    


 


Protein/Creat Ratio 24h  962 mg/g creat (</=84)  H  05/18/18  19:04    


 


Urine Total Protein  255 mg/L ()  H  05/18/18  19:04    


 


Fluid Source  Pleural/thoracentesi   05/18/18  15:49    


 


Fluid Appearance  Clear  (CLEAR)   05/18/18  15:49    


 


Fluid WBC  113.0 /uL (0.0-300.0)   05/18/18  15:49    


 


Fluid RBC  19.0 /uL (0.0-0.0)  H  05/18/18  15:49    


 


Fluid Tot Cell Count  100  (0-0)  H  05/18/18  15:49    


 


Fluid Neutrophils  8.0 % (0-0)  H  05/18/18  15:49    


 


Fluid Lymphocytes  92.0 % (0-0)  H  05/18/18  15:49    


 


Fld Monocyte/Macrophag  0 % (0-0)   05/18/18  15:49    


 


Pleural pH  7.5   05/18/18  15:49    














- Hospital Course


Hospital Course: 





As per admission documentation





77 M with history of CAD s/p ILYA and CABG (09/2017), hypertension, CKD GFR 15%, 

hyperlipidemia, presents for exertional sob for past 1 week. Pt reports 

increasing sob with activities like walking a few blocks. Also reports leg 

edema over past month, orthopnea. Pt's PMD did outpatient CT chest, which 

showed bilateral large pleural effusions, pt was sent here to [facility] [place] for 

evaluation. Denies fevers, chills, cp, palpitations, nausea, vomiting, 

abdominal pain, urinary symptoms, scrotal swelling.





Hospital course





Patient was admitted to telemetry for bilateral pleural effusion and CHF 

exacerbation. Cardio was consulted, Dr. Campoverde. Nephro was consulted Dr. Muse. 

Patient was started on his home medications except for CCB due to leg swelling. 

CXR showed b/l pleural effusion. IR was consulted for thoracentesis, which was 

preformed on 5/18. 1,700mL of clear, louise fluid drained from the right side. 





ECHO 5/19/18 - EF ~ 63%, normal LV wall thickness, mild TR, mild/mod pulm htn, 

large L pleural effusion, small/mod circumferential pericardial effusion, no 

tamponade.


Thoracentesis   - Fluid Analysis pending


   - Total cell count 100


   - Neutrophil 8


   - Lymph 92


   - pleural pH 7.5


   - pleural lipase 4


   - LDH/total protein/cultures/cytology pending 


   





Per Dr. Campoverde, pleural effusions not believed to be secondary to CHF. Patient's 

lasix was increased to 40mg PO BID, this was discussed/agreed upon with Dr. Muse in setting rising creatinine. Patient's home metoprolol was STOPPED 

because patient had an isolated episode of low heart rate in 30's. DDx for 

pleural effusions thought to be 2/2 mets/possible TB. Pulmonology consulted, 

Dr. Ibarra for b/l pleural effusion.  Isolation was placed momentarily but 

quickly removed as Dr. Weiss was inhouse and saw patient quickly. TB not 

likely as patient does not have any other signs or symptoms, no cough, fevers 

or lesions seen on CXR. Recent Chest CT 5/8/18 - Moderate to large bilateral 

pleural effusions are seen. Small pericardial effusion. Patient instructed to 

follow up with Dr. Weiss as outpatient and safe to discharge home. Patient 

was discharged on 5/19 with the following instructions.





Discharge Instructions





1. Follow up with Dr. ERNESTO Jackson within 1 week.


2. Follow up with Dr. Campoverde, cardiologist, within 1-2 weeks regarding re-start 

metoprolol. 


   - Metoprolol was STOPPED because patient had an isolated episode of low 

heart rate. DO NOT take medication until following up with Dr. Campoverde.


3. Follow up with Dr. Muse, kidney doctor, in 1-2 weeks and for long term 

chronic kidney disease management.


4. Follow up with Dr. Weiss, lung doctor in 1 week to follow up on pleural 

fluid analysis.


5. Take medications as directed.


   - STOP Metoprolol


   - Start Imdur ER 15 mg take once per day by mouth


   - Start Hydralazine 15 mg take twice per day by mouth


   - Furosemide (Lasix) was increased to 40 mg by mouth twice per day. Stop 

taking home Lasix. Do not combine old and new prescriptions.


6. If patient experiences any new or worsening symptoms, please go to the 

nearest emergency room.





This is just a brief summary of the patient's hospital course. For full detail, 

please see EMR.





Discharge Exam





- Head Exam


Head Exam: ATRAUMATIC, NORMOCEPHALIC





- Eye Exam


Eye Exam: EOMI, Normal appearance





- ENT Exam


ENT Exam: Mucous Membranes Moist





- Respiratory Exam


Respiratory Exam: Decreased Breath Sounds (left base), NORMAL BREATHING 

PATTERN.  absent: Accessory Muscle Use, Rales, Rhonchi, Wheezes, Respiratory 

Distress





- Cardiovascular Exam


Cardiovascular Exam: REGULAR RHYTHM, +S1, +S2





- GI/Abdominal Exam


GI & Abdominal Exam: Normal Bowel Sounds, Soft, Unremarkable.  absent: Distended

, Firm, Guarding, Rigid





- Extremities Exam


Extremities exam: pedal edema (2+ up to mid shin), pedal pulses present





- Back Exam


Back exam: absent: CVA tenderness (L), CVA tenderness (R)





- Neurological Exam


Neurological exam: Alert, CN II-XII Intact, Oriented x3





- Psychiatric Exam


Psychiatric exam: Normal Affect, Normal Mood





- Skin


Skin Exam: Dry, Warm





Discharge Plan





- Discharge Medications


Prescriptions: 


Atorvastatin [Lipitor] 20 mg PO DIN #30 tab


Benzonatate [Tessalon Perles] 100 mg PO TID PRN #40 sgl


 PRN Reason: cough


Ergocalciferol [Drisdol 50,000 Intl Units Cap] 1 cap PO Q7D #2 cap


Furosemide [Lasix] 40 mg PO BID #28 tab


hydrALAZINE [Apresoline] 15 mg PO BID #90 tab


Isosorbide Mononitrate ER [Imdur ER] 15 mg PO DAILY #30 tab


Sodium Bicarbonate Tab 650 mg PO BID #28 tab





- Follow Up Plan


Condition: GUARDED


Disposition: HOME/ ROUTINE


Instructions:  Heart Failure, Adult (DC), Kidney Disease Diet (For People Not 

on Dialysis), Pleural Effusion (DC)


Additional Instructions: 


1. Follow up with Dr. ERNESTO Jackson within 1 week.


2. Follow up with Dr. Campoverde, cardiologist, within 1-2 weeks regarding re-start 

metoprolol. 


   - Metoprolol was STOPPED because patient had an isolated episode of low 

heart rate. DO NOT take medication until following up with Dr. Campoverde.


3. Follow up with Dr. Muse, kidney doctor, in 1-2 weeks and for long term 

chronic kidney disease management.


4. Follow up with Dr. Weiss, lung doctor in 1 week to follow up on pleural 

fluid analysis.


5. Take medications as directed.


   - STOP Metoprolol


   - Start Imdur ER 15 mg take once per day by mouth


   - Start Hydralazine 15 mg take twice per day by mouth


   - Furosemide (Lasix) was increased to 40 mg by mouth twice per day. Stop 

taking home Lasix. Do not combine old and new prescriptions.


6. If patient experiences any new or worsening symptoms, please go to the 

nearest emergency room.


Referrals: 


Justice Weiss MD [Staff Provider] - 


Adryan Muse MD [Staff Provider] - 


Shilo Campoverde MD [Staff Provider] - 





Clinical Quality Measures





- CQM - Heart Failure


Ejection Fraction: 40 % or Greater


Left Ventricular Function to be assessed after discharge: Yes


ACE Inhibitor Prescribed: Yes


Beta-Blocker Prescribed: None


Contraindication/Reason for not providing: episode of bradycardia


Angiotensin II Receptor Blocker Prescribed: No


Contraindication/Reason for not providing: on acei


AnticoagulationTherapy for Atrial Fibrillation/Atrialflutter: No


Contraindication/Reason for not providing: no afib


Hydralazine Nitrate Prescribed: Yes


Implantable Cardioverter Defibrillator Therapy: No


Contraindication/Reason for not providing: not indicated


Cardiac Resynchronization Therapy Prescribed: No


Contraindication/Reason for not providing: in sinus


Will be discharged to: Home


Follow Up Date (must be within 7 days from discharge): 05/23/18 (f/u in 1 week 

with Dr. Campoverde)


Follow Up Time: 09:00





<Alexandra Lopez - Last Filed: 05/20/18 10:09>





Provider





- Provider


Date of Admission: 


05/17/18 10:36





Attending physician: 


Alexandra Lopez MD








Hospital Course





- Lab Results


Lab Results: 


 Most Recent Lab Values











WBC  15.5 10^3/ul (4.5-11.0)  H D 05/19/18  06:00    


 


RBC  4.64 10^6/uL (3.5-6.1)   05/19/18  06:00    


 


Hgb  12.9 g/dL (14.0-18.0)  L  05/19/18  06:00    


 


Hct  38.4 % (42.0-52.0)  L  05/19/18  06:00    


 


MCV  82.8 fl (80.0-105.0)   05/19/18  06:00    


 


MCH  27.8 pg (25.0-35.0)   05/19/18  06:00    


 


MCHC  33.6 g/dl (31.0-37.0)   05/19/18  06:00    


 


RDW  14.3 % (11.5-14.5)   05/19/18  06:00    


 


Plt Count  239 10^3/uL (120.0-450.0)   05/19/18  06:00    


 


MPV  9.7 fl (7.0-11.0)   05/19/18  06:00    


 


Gran %  83.0 % (50.0-68.0)  H  05/19/18  06:00    


 


Lymph % (Auto)  11.7 % (22.0-35.0)  L  05/19/18  06:00    


 


Mono % (Auto)  5.2 % (1.0-6.0)   05/19/18  06:00    


 


Eos % (Auto)  0.0 % (1.5-5.0)  L  05/19/18  06:00    


 


Baso % (Auto)  0.1 % (0.0-3.0)   05/19/18  06:00    


 


Gran #  12.89  (1.4-6.5)  H  05/19/18  06:00    


 


Lymph # (Auto)  1.8  (1.2-3.4)   05/19/18  06:00    


 


Mono # (Auto)  0.8  (0.1-0.6)  H  05/19/18  06:00    


 


Eos # (Auto)  0.0  (0.0-0.7)   05/19/18  06:00    


 


Baso # (Auto)  0.02 K/mm3 (0.0-2.0)   05/19/18  06:00    


 


PT  12.6 SECONDS (9.4-12.5)  H  05/17/18  11:20    


 


INR  1.09  (0.93-1.08)  H  05/17/18  11:20    


 


APTT  30.9 Seconds (25.1-36.5)   05/17/18  11:20    


 


Sodium  142 mmol/L (132-148)   05/19/18  06:00    


 


Potassium  4.0 mmol/L (3.6-5.0)   05/19/18  06:00    


 


Chloride  102 mmol/L ()   05/19/18  06:00    


 


Carbon Dioxide  24 mmol/L (21-33)   05/19/18  06:00    


 


Anion Gap  20  (10-20)   05/19/18  06:00    


 


BUN  42 mg/dL (7-21)  H  05/19/18  06:00    


 


Creatinine  2.8 mg/dl (0.8-1.5)  H  05/19/18  12:00    


 


Est GFR ( Amer)  27   05/19/18  06:00    


 


Est GFR (Non-Af Amer)  22   05/19/18  06:00    


 


Random Glucose  121 mg/dL ()  H  05/19/18  06:00    


 


Calcium  9.4 mg/dL (8.4-10.5)   05/19/18  06:00    


 


Phosphorus  3.6 mg/dL (2.5-4.5)   05/17/18  06:40    


 


Magnesium  2.1 mg/dL (1.7-2.2)   05/17/18  06:40    


 


Iron  48 ug/dL ()   05/17/18  12:00    


 


TIBC  289 ug/dL (261-462)   05/17/18  12:00    


 


% Saturation  17 % (20-55)  L  05/17/18  12:00    


 


Ferritin  70.7 ng/mL  05/17/18  12:00    


 


Total Bilirubin  1.2 mg/dL (0.2-1.3)   05/19/18  06:00    


 


AST  22 U/L (17-59)   05/19/18  06:00    


 


ALT  15 U/L (7-56)   05/19/18  06:00    


 


Alkaline Phosphatase  51 U/L ()   05/19/18  06:00    


 


Troponin I  < 0.01 ng/mL D 05/17/18  06:40    


 


NT-Pro-B Natriuret Pep  2580 pg/mL (0-450)  H  05/17/18  06:40    


 


Total Protein  7.4 g/dL (5.8-8.3)   05/19/18  06:00    


 


Albumin  3.9 g/dL (3.0-4.8)   05/19/18  06:00    


 


Globulin  3.5 gm/dL  05/19/18  06:00    


 


Albumin/Globulin Ratio  1.1  (1.1-1.8)   05/19/18  06:00    


 


25-OH Vitamin D Total  14.1 NG/ML (30.0-100.0)  L  05/18/18  05:30    


 


Urine Color  Yellow  (YELLOW)   05/17/18  11:58    


 


Urine Appearance  Clear  (CLEAR)   05/17/18  11:58    


 


Urine pH  7.0  (4.7-8.0)   05/17/18  11:58    


 


Ur Specific Gravity  1.010  (1.005-1.035)   05/17/18  11:58    


 


Urine Protein  30 mg/dL (<30 mg/dL)  H  05/17/18  11:58    


 


Urine Glucose (UA)  Negative mg/dL (NEGATIVE)   05/17/18  11:58    


 


Urine Ketones  Negative mg/dL (NEGATIVE)   05/17/18  11:58    


 


Urine Blood  Trace-lysed  (NEGATIVE)  H  05/17/18  11:58    


 


Urine Nitrate  Negative  (NEGATIVE)   05/17/18  11:58    


 


Urine Bilirubin  Negative  (NEGATIVE)   05/17/18  11:58    


 


Urine Urobilinogen  0.2 E.U./dL (<1 E.U./dL)   05/17/18  11:58    


 


Ur Leukocyte Esterase  Negative Haley/uL (NEGATIVE)   05/17/18  11:58    


 


Urine RBC  1 - 3 /hpf (0-2)   05/17/18  11:58    


 


Urine WBC  0 - 2 /hpf (0-6)   05/17/18  11:58    


 


Ur Epithelial Cells  None /hpf (0-5)   05/17/18  11:58    


 


Urine Bacteria  Few  (NEG)   05/17/18  11:58    


 


U Random Total Protein  36 mg/L  05/19/18  12:00    


 


Urine Collection Time  24 HOURS  05/19/18  12:00    


 


Urine Total Volume  2525 mL (800-1400)  H  05/19/18  12:00    


 


Urine Creatinine  0.27 g/L  05/18/18  19:04    


 


Ur Creatinine 24 Hour  See note g/24 h (0.63-2.50)   05/18/18  19:04    


 


Creatinine Clearance  12.0 ml/min ()  L  05/19/18  12:00    


 


Ur Total Protein 24 Hr  See note mg/24 h (<150)   05/18/18  19:04    


 


Protein/Creat Ratio 24h  962 mg/g creat (</=84)  H  05/18/18  19:04    


 


Urine Total Protein  255 mg/L ()  H  05/18/18  19:04    


 


Fluid Source  Pleural/thoracentesi   05/18/18  15:49    


 


Fluid Appearance  Clear  (CLEAR)   05/18/18  15:49    


 


Fluid WBC  113.0 /uL (0.0-300.0)   05/18/18  15:49    


 


Fluid RBC  19.0 /uL (0.0-0.0)  H  05/18/18  15:49    


 


Fluid Tot Cell Count  100  (0-0)  H  05/18/18  15:49    


 


Fluid Neutrophils  8.0 % (0-0)  H  05/18/18  15:49    


 


Fluid Lymphocytes  92.0 % (0-0)  H  05/18/18  15:49    


 


Fld Monocyte/Macrophag  0 % (0-0)   05/18/18  15:49    


 


Pleural pH  7.5   05/18/18  15:49    


 


Pleural Lipase  4.0 U/L (<10)   05/18/18  15:49    














Attending/Attestation





- Attestation


I have personally seen and examined this patient.: Yes


I have fully participated in the care of the patient.: Yes


I have reviewed all pertinent clinical information, including history, physical 

exam and plan: Yes


Notes (Text): 





05/20/18 09:59





Attending note;





Patient seen and examined with resident.


Patient's daughter by the bedside.





Patient is [id number] old  Male with history of CAD and CABG (09/2017), 

hypertension, CKD, hyperlipidemia, presents for shortness of breath on exertion 

for past 1 week. Patient also has leg swelling.


Acute systolic heart failure;   currently on IV Lasix.


Leg swelling improved.


Shortness of breath improved.


Patient's weight is decreasing.


Clinically feeling much better.





Patient had significant pleural effusion in the chest CT.


Echocardiogram showed ejection fraction of 63%. Cardiology evaluation 

appreciated.





Chronic kidney disease; increased creatinine secondary to aggressive diuresis.


Nephrology evaluation appreciated.





status post thoracentesis. Preliminary studies shows no evidence of infection.


Pending culture results and cytology.





Pulmonary evaluation with  appreciated. Patient will follow-up 

with him as outpatient.





Bradycardia; metoprolol stopped. Started on hydralazine and Imdur.


Follow-up with cardiology within 3-5 days for reevaluation of medication.





Case discussed with patient in detail.





Case discussed with patient's daughter in detail.





Case discussed with PMD in detail. Discharged home today.





Patient needs close follow-up with cardiology/pulmonary/ nephrology and PMD.


Patient has contact information for all the consultants.





Upon discharge the patient will follow-up with PMD Dr. Jackson.














05/20/18 10:08

## 2018-05-20 LAB
LDH PLR-CCNC: 48 U/L
M TB IFN-G CD4+ T-CELLS BLD-ACNC: 3.1 G/DL

## 2018-05-21 LAB
ALBUMIN MFR UR ELPH: 62.3 RELATIVE %
ALPHA-1 GLOBULIN: 6.6 RELATIVE %